# Patient Record
Sex: FEMALE | Race: OTHER | HISPANIC OR LATINO | Employment: UNEMPLOYED | URBAN - METROPOLITAN AREA
[De-identification: names, ages, dates, MRNs, and addresses within clinical notes are randomized per-mention and may not be internally consistent; named-entity substitution may affect disease eponyms.]

---

## 2021-07-08 ENCOUNTER — OFFICE VISIT (OUTPATIENT)
Dept: OBGYN CLINIC | Facility: CLINIC | Age: 26
End: 2021-07-08

## 2021-07-08 VITALS — HEART RATE: 75 BPM | DIASTOLIC BLOOD PRESSURE: 71 MMHG | SYSTOLIC BLOOD PRESSURE: 108 MMHG | WEIGHT: 120 LBS

## 2021-07-08 DIAGNOSIS — N92.6 MISSED MENSES: Primary | ICD-10-CM

## 2021-07-08 LAB — SL AMB POCT URINE HCG: POSITIVE

## 2021-07-08 PROCEDURE — 81025 URINE PREGNANCY TEST: CPT | Performed by: OBSTETRICS & GYNECOLOGY

## 2021-07-08 PROCEDURE — 76830 TRANSVAGINAL US NON-OB: CPT | Performed by: OBSTETRICS & GYNECOLOGY

## 2021-07-08 PROCEDURE — 99214 OFFICE O/P EST MOD 30 MIN: CPT | Performed by: OBSTETRICS & GYNECOLOGY

## 2021-07-08 NOTE — PROGRESS NOTES
FIRST TRIMESTER OBSTETRIC ULTRASOUND  7/8/2021  Michael Hudson MD     INDICATION: Amenorrhea, viability    COMPARISON: None  TECHNIQUE:   Transvaginal imaging was performed to assess the gestation, myometrial/endometrial architecture and ovarian parenchymal detail  The study includes volumetric sweeps and traditional still imaging technique  FINDINGS:     Twin intrauterine gestation is identified  Cardiac activity is detected at 187 bpm for twin A and 185 bpm for twin B  YOLK SAC:  Present and normal in size and appearance  MEAN CROWN RUMP LENGTH:  Twin A: 2 12 cm= 9w0d  Twin B: 2 37 cm= 8w6d  AMNIOTIC FLUID/SAC SHAPES:  Within expected normal range  UTERUS/ADNEXA:   No adnexal mass or pathologic cyst   No free fluid identified  IMPRESSION:    According to , will date based off sono  Final DARIA: 2/11/2022  Gestational age: 10w7d  Fetal cardiac activity detected  No adnexal masses seen

## 2021-07-19 ENCOUNTER — TELEMEDICINE (OUTPATIENT)
Dept: OBGYN CLINIC | Facility: CLINIC | Age: 26
End: 2021-07-19

## 2021-07-19 ENCOUNTER — TELEPHONE (OUTPATIENT)
Dept: PERINATAL CARE | Facility: CLINIC | Age: 26
End: 2021-07-19

## 2021-07-19 DIAGNOSIS — Z34.91 PRENATAL CARE IN FIRST TRIMESTER: Primary | ICD-10-CM

## 2021-07-19 PROCEDURE — 99212 OFFICE O/P EST SF 10 MIN: CPT

## 2021-07-19 NOTE — TELEPHONE ENCOUNTER
Annie Souza the nurse from the Ob's office called to schedule new patient appointment  She had asked us to call the patient and try to schedule her in [facility]  I called the patient and left message for her to call us back to confirm appointment scheduled for her 8/11/21 @ 8 am in [facility]  She was put on the scheduled tentatively until patient confirms  New patient packet mailed to patient's address

## 2021-07-19 NOTE — LETTER
Proof of Pregnancy Letter    Haris Ribeiro  1995  [address]        07/19/21      Haris Ribeiro is a patient at our facility  Haris Ribeiro Estimated Date of Delivery: 2/11/2022       Any questions or concerns, please feel free to contact our office      Sincerely,     [facility]

## 2021-07-19 NOTE — PROGRESS NOTES
Virtual Brief Visit    Verification of patient location:    Patient is currently located in the state [facility]  Patient is currently located in a state in which I am licensed    Assessment/Plan:    Problem List Items Addressed This Visit     None      Visit Diagnoses     Prenatal care in first trimester    -  Primary    Relevant Orders    Prenatal Panel    Hepatitis C antibody    Hemoglobin Electrophoresis    Glucose, 1H PG    Ambulatory referral to social work care management program    Ambulatory Referral to Maternal Fetal Medicine                Reason for visit is   Chief Complaint   Patient presents with    Initial Prenatal Visit        Encounter provider Kedar Stephens RN    Provider located at [address] [place] [id number] [phone]    Recent Visits  No visits were found meeting these conditions  Showing recent visits within past 7 days and meeting all other requirements  Today's Visits  Date Type Provider Dept   07/19/21 Telemedicine Kedar Stephens RN  [address] [facility] [address] [place]hatt today's visits and meeting all other requirements  Future Appointments  No visits were found meeting these conditions  Showing future appointments within next 150 days and meeting all other requirements       After connecting through telephone, the patient was identified by name and date of birth  Ham Glen was informed that this is a telemedicine visit and that the visit is being conducted through telephone  My office door was closed  No one else was in the room  She acknowledged consent and understanding of privacy and security of the platform  The patient has agreed to participate and understands she can discontinue the visit at any time  Patient is aware this is a billable service  OBSTETRIC INTAKE VISIT    Ham Carroll presents today for initial OB intake    History obtained from patient and she reports it as follows:    Past Medical History:   Diagnosis Date    Lupus ([address] )      Past Surgical History:   Procedure Laterality Date    CHOLECYSTECTOMY       OB History    Para Term  AB Living   3 2 2 0 0 2   SAB TAB Ectopic Multiple Live Births   0 0 0 0 2      # Outcome Date GA Lbr Kushal/2nd Weight Sex Delivery Anes PTL Lv   3 Current            2 Term 12/25/15 38w0d  3345 g (7 lb 6 oz) M Vag-Spont  N ALINE   1 Term 14 40w0d  3345 g (7 lb 6 oz) M Vag-Spont  N ALINE     Social History     Tobacco Use    Smoking status: Never Smoker    Smokeless tobacco: Never Used   Vaping Use    Vaping Use: Never used   Substance Use Topics    Alcohol use: Not Currently    Drug use: Never       No current outpatient medications    No Known Allergies    Dating of Pregnancy: ultrasound done 2021, DARIA 2022  Twins    Review of Systems:   Denies vaginal bleeding or leaking of fluid  Denies uterine contractions or abdominal pain  Exam:  LMP 2021 (Exact Date)        Plan:  1  Prenatal care in first trimester  - Prenatal Panel  - Hepatitis C antibody  - Hemoglobin Electrophoresis  - Glucose, 1H PG  - Ambulatory referral to social work care management program  - Ambulatory Referral to Maternal Fetal Medicine  2  Next ultrasound scheduled for [address] [facility] staff to call patient with appointment  3  Return 2021 for pn H/P  4   Reviewed [facility]'s Pregnancy Essential Guide,  the following educational topics with patient:   -routine prenatal visit/ultrasound/labwork schedule   -nutritional demands of pregnancy, healthy dietary habits   -listeria, toxoplasmosis, seafood precautions   -weight gain expectations (based on pre-pregnant BMI)   -exercise, rest, and sexual activity during pregnancy   -abstinence from alcohol, tobacco, and illegal drugs   -common discomforts of pregnancy and appropriate management   -OTC medications safe to use in pregnancy   -genetic screening options   -[facility] referral   -symptoms to report to [facility] provider    -signs of PTL    -vaginal bleeding/leaking of fluid    -severe n/v-unable to tolerate ANY food/fluids for more than 24 hours    Explained to patient how to contact Health Call After office hours  I spent 40 minutes with patient today in which greater than 50% of the time was spent in counseling/coordination of care regarding prenatal patient  Nuris Fernandez RN  7/19/2021    VIRTUAL VISIT DISCLAIMER      Christi Keller verbally agrees to participate in [employer]  Pt is aware that [employer] could be limited without vital signs or the ability to perform a full hands-on physical Burnice Robbie understands she or the provider may request at any time to terminate the video visit and request the patient to seek care or treatment in person

## 2021-07-19 NOTE — PATIENT INSTRUCTIONS
El Primer Trimestre  (hasta 14 semanas)   ARVIZU BEBÉ   · Arvizu bebé comienza a desarrollarse cuando el óvulo y un espermatozoide se unen  · Arvizu bebé crece dentro de arvizu útero (también llamado tu vientre)  Flota dentro de kolton bolsa de agua - llamado el saco amniótico  El bebé está conectado a ti por un cordón umbilical que va desde el vientre del bebé a la placenta  La placenta se une a arvizu útero y la placenta es donde Kiana, oxígeno y alimentos cruzan encima de usted a arvizu bebé  · Cosas camilo drogas, alcohol y tabaco pueden también cruzar de usted a arvizu bebé a través de la placenta  Es importante evitar estas cosas, camilo pueden ser perjudiciales para cómo tu bebé se desarrolla y crece  · Al final del primer mes, late el corazón de arvizu bebé  El bebé es aproximadamente del tamaño de un trozo de arroz  · Al final del tashia mes, todos los órganos del bebé (corazón, pulmones, cerebro, cráneo) toussaint formado completamente  Ahora sólo tienen que seguir madurando y creciendo  El bebé es aproximadamente del tamaño de [place]  · Al final del tercer mes, arvizu bebé es de aproximadamente 4 pulgadas de jessica! TU CUERPO   · Arvizu período se detiene - esto puede ser la primera señal que tienes que está [place]   · Tus pechos pueden volverse dolorido y [place]  · Se puede sentir muy cansada  · Usted puede tener un malestar estomacal con náuseas o vómitos que pueden ocurrir en cualquier momento del día - o a veces won todo el día  · Usted puede sentirse malhumorado y gruñón  También puede sentir miedo o phillips  [place] es normal y natural    · Usted puede perder o ganar peso  [place] está tory, siempre y cuando usted no está perdiendo o ganando [employer]          [place] Trimestre  (14-28 semanas)   ARVIZU BEBÉ   · 4to mes   · arvizu bebé tiene pestañas y sandy   · arvizu bebé patea, se mueve y se traga   · arvizu bebé es 6 a 7 pulgadas de jessica   · 5to mes   · arvizu bebé tiene uñas   · arvizu bebé Glenda Hough a dormir y despertar ciclos   · bebé Kale Bars a ser Starlene Hair Todd Hernandezough (aunque no siempre sientes lo) girando de lado a lado y [place]   · rios bebé es de 8 a 12 pulgadas de jessica y pesa en cualquier lugar de ½ a 1 candace   · 6to mes   · los ojos de rios bebé están gege completamente formados y pronto comenzarán a abrir y cerrar   · la piel del bebé es Esteban Dally y [place] y [place] con pelo bj y [place] llamado "lanugo"   · bebé sigue siendo muy activo y que debe estar sintiendo muy tory y muy a menudo   · rios bebé es de 11 a 14 pulgadas de jessica     TU CUERPO   · Nauseas del embarazo usualmente comienza a desaparecer y las mujeres generalmente comienzan a subir de peso más rápidamente  · Puede comenzar a tener estrías en el vientre o senos que pueden ser "picores"  Frotarlas loción sobre ellos para ayudar a aliviar la Elveria Stake  · Tu flujo vaginal puede llegar a ser de color blanquecino  · Usted puede ser estreñido  Intente aumentar la Luis Schwab, o puede amairani kolton pastilla de suavizante de heces (camilo Colace) para aliviar el malestar  · Puede comenzar a tener ardor de estómago  Medicamentos camilo Tums o Maalox pueden ayudar a aliviar esto  Trate de permanecer en kolton posición sentada por lo menos kolton hora después de las comidas para disminuir la acidez estomacal    · Deberías probar a 8 horas de sueño en la noche, además de kolton siesta won el día si es posible  · No deberías sentarte won más de dos horas sin amairani un descanso para estirar las piernas  El Tercer Trimestre  (28-42 semanas)   RIOS BEBÉ   · rios bebé chupa rios dedo ahora! · rios bebé puede oír voces y responder al tacto    habla con Jessica Naik!!   · el cerebro de rios bebé crece y se desarrolla más en los últimos 2 meses de embarazo   · germán y [place] del bebé son Judieth Chafe y flexibles para que quepan por el canal del parto   · los movimientos del bebé cambian hacia el final del embarazo porque hay menos espacio para patear y estiramientos en tu vientre   · los pulmones del bebé no están completamente desarrollados y totalmente preparados para respirar por amanda propios hasta el último 3-4 semanas antes de rios fecha de vencimiento     TU CUERPO   · rios vientre está creciendo mucho ahora   · será más difícil dormir tory de noche o ser tan activos camilo eres generalmente   · puede sudar más de lo habitual   · serás más desequilibrado    tenga cuidado de no caer! · Usted puede desarrollar hemorroides (qué pueden ser dolorosas y hacen difícil sentarse)   · los dos últimos meses del embarazo puede ser muy incómodos con darrick de [place], darrick de [place] y ardor de estómago   · Puedes empezar a tener contracciones    mientras son irregulares y Michelle de 5 por hora, esto es kolton parte normal de rios cuerpo a punto de tener un bebé   · el merary uterino puede empezar a dilatar y abrir [facility]    para estar listo para el parto   · Usted puede encontrarse que necesitan hacer orinar muy a menudo    porque el bebé está presionando mucho la vejiga   · Usted puede quedarse corta de respiracion más rápido de lo kodi          Mi bebé está desarrollando normalmente? ESTUDIOS GENETICO      Kolton de las preocupaciones que muchas mujeres tienen acerca de rios embarazo es si rios bebé se está desarrollando normalmente  Existen varias pruebas diferentes que podemos usar para tratar de ayudar y determinar si los bebés están desarrollando normalmente o no  Algunas mujeres tienen un riesgo más alto para que rios bebé se desarrollan anormalmente (a veces llamado un defecto de nacimiento), roberto le puede pasar a CUALQUIER  Es por eso que ofrecemos estas pruebas a TODAS las mujeres won amanda [place]  Ninguno de [place] exámenes son requerido  Es rios decisión cuáles puede elegir o NO eligir ninguno won rios embarazo  Algunas de estas pruebas sólo están disponibles en un determinado momento won rios embarazo, así que es importante amairani decisiones sobre las pruebas qué desea temprano en el Chuck Beau   Aquí hay información sobre estas pruebas diferentes para ayudarle a amairani decisiones sobre si alguna de estas pruebas son [place] para usted  * ANATOMIA ULTRASONIDO : esta ultrasonido se realiza entre 18 a 25 semanas y Cherelle de cerca a todas partes de rios bebé y piezas para buscar signos de cualquier desarrollo anormal; el ultrasonido no es perfecto y NO PUEDE detectar TODOS los tipos de defectos de nacimiento (especialmente síndrome de [place]) - roberto es kolton prueba [place]  * PROYECCIÓN SECUENCIAL: esta es realmente kolton combinación de pruebas que incluyen un ultrasonido que mide la parte posterior del merary de rios bebé [employer] semanas 11-13 y análisis de beni de usted en que kolton y otra vez entre las semanas 15-22; Derrick examen puede ayudar a decirnos el riesgo para rios bebé se ve afectada por ciertas anomalías cromosómicas o defectos congénitos  * CUÁDRUPLE PANTALLA: derrick examen se hace con análisis de beni sólo de usted entre 15-22 semanas de [facility]; puede ayudar a decirnos el riesgo para rios bebé se ve afectada por ciertas anomalías cromosómicas o defectos congénitos  * CELULAR-NICOLE DE ADN : esta prueba se realiza con análisis de beni sólo de usted cualquier momento después de 10 semanas de embarazo; es muy preciso al decirnos si rios bebé tiene kolton evelyn probabilidad de síndrome de Down u otras anomalías del cromosoma roberto es generalmente reservado para las mujeres que tienen un factor de alto riesgo para un bebé con kolton anormalidad del cromosoma  * LAI MATERNAL penitentiary-FETO PROTEINA PANTALLA: derrick examen se hace con el solo análisis de [place] de entre 15-22 semanas de Janet, nos ayuda a [employer] idea si rios mary jane esta desarrollando un defecto de nacimiento camilo melinda bifida       * AMNIOCENTESIS : Esta prueba implica el uso de kolton aguja muy deysi que se inserta en el útero para sacar un poco de líquido alrededor de rios bebé para la prueba; se puede realizar cualquier momento después de 16 semanas de embarazo; [facility] nos dice con certeza si rios bebé tiene defectos de nacimiento particular (sobre todo anormalidades del cromosoma); hay un pequeño riesgo de aborto espontáneo que [place] cuando marlon tiene esta prueba realizada; esta prueba es generalmente reservada para las mujeres que tienen un factor de alto riesgo para un bebé con kolton anormalidad  Las mujeres que tienen un riesgo más alto para los bebés que se desarrollan anormalmente (a veces llamado un defecto de nacimiento) incluir a las mujeres con los siguientes:   · 28 años de edad o mayores   · Obesidad en el momento de la patrizia   · Diabetes en el momento de la patrizia   · Un sushma anterior con un defecto de nacimiento   · Tomando medicamentos que pueden causar un defecto de nacimiento     Aquí están algunas preguntas importantes para hacerse al decidir que (eventualmente) de pruebas quiero tener  · ¿Quiero saber antes de nacer si mi bebé tiene un defecto de nacimiento? · ¿Qué haré con esta información si el resultado muestra kolton gran oportunidad para un defecto de nacimiento? · ¿Cómo aprender acerca de un defecto de nacimiento me ayudaría a amairani decisiones sobre mi embarazo? · ¿Estas pruebas me ayudará sentir más tranquilos o más ansiedad y miedo?              [address] está kolton lista de [place] que son seguros para amairani won el embarazo sin tener que discutir con el médico o enfermera:     ·        Tylenol/Acetaminophen (dolor de germán Tamanna Hamper)   ·        Benadryl/Diphenhydramine (alergias, congestión nasal, insomnio, picazon)   ·        Claritin/Loratidine (alergias, congestión nasal)   ·        Zyrtec/Cetirizine (alergias, congestión nasal)   ·        Robitussin/Guaifenesin regular (tos)   ·        Sudafed/Pseudoephedrine (congestión nasal)   ·        Colace/Docusate sodium (estreñimiento)   ·        Maalox/Magnesium hydroxide (acidez, indigestión)   ·        Zantac/Ranitidine (acidez, indigestión)   · Pepcid/Famotidine (acidez, indigestión)   ·        Tums/Calcium carbonate Liane Lout, náuseas)   ·        Kaopectate/Bismuth subsalicylate (diarrea)   ·        Immodium/Loperamide (diarrea)   ·        Vitamina B6/Pyrodoxine (náuseas)   ·        Doxylamine/Unisom (náuseas, insomnio)     Debe discutir con nuestra enfermera practicante o apple de nuestros médicos antes de amairani cualquier otro medicamento  NUTRICION EN EL EMBARAZO  [place] nutrición es kolton parte importante de tener un embarazo saludable y un bebé thuy  Usted debe seguir kolton dieta saludable que incluyen los siguientes:   · Verduras (que son oscuros katherin y frondoso): al menos 2 raciones cada día   · Proteína (carne, huevos, frijoles, nueces, mantequilla de [place]): 3-4 raciones cada día   · Granos panes/todo (pan, pasta, arroz, tortillas, prabhakar): 3 porciones cada día   · Productos lácteos ([place], yogur, Dawson-barre): 3-4 raciones cada día   · De agua: 6-8 vasos por día   · Calorías: aproximadamente 2000 a 2200 calorías por día    AUMENTO DE PESO   Aumento de peso recomendado para usted won rios embarazo se basa en el índice de masa corporal ([address]) en el momento en que usted Yaz Jose  Aumento de peso recomendado de [address] antes del embarazo   Bajo peso [facility] inferior a 18,5) 28 a 40 libras   Normal (IMC 18 5-24 9) de peso 25 a 35 libras   Sobrepeso (IMC 25-29 9) 15 a 25 libras   Tanya ([address] de 30 o mayor) 11 a [address] LOS ALIMENTOS   Es muy importante comer sólo alimentos preparados en forma vines won el embarazo camilo usted y rios bebé tienen un riesgo más alto de lo habitual para ser afectados por enfermedades transmitidas por los alimentos   Siga estos pasos para asegurarse de que usted y rios bebé estén a georgie de las enfermedades transmitidas por los alimentos won el embarazo:   · imelda tory las césar con agua tibia y jabón antes y después de manipular cualquier alimento   · Loralyn Mleonie de cortar, platos, utensilios y mostradores con [place] y jabón antes y después de preparar cualquier alimento   · enjuague de frutas crudas y verduras minuciosamente bajo chorro de agua antes de comer   · [employer] y la carne cruda separada de otros alimentos y usar tableros/utensilios de cha diferentes para manejar carne cruda de otros alimentos   · poner alimentos cocidos en un plato recién limpio   · Cocine todos los alimentos tory   · Deseche los alimentos que se santamaria dejado hacia fuera para más de 2 horas   · Refrigere o congele alimentos que pueden echar a perder     Hay courtney alimentarias particulares riesgos que tener en cuenta y evitar ya que pueden causar grave dañan a rios sushma zaria  * Listeria (kolton bacteria perjudicial)   · no comer salchichas o embutidos (a menos que esté recalentados hasta cocer al vapor caliente)   · no coma quesos blandos (tales camilo Feta, Brie, Camembert) a menos que específicamente se etiquetan camilo siendo "hecho con Hannah Plump"   · no hernan leche cruda (sin pasteurizar)   · no comer patés refrigerados o se separa de la carne   · no coma mariscos ahumados refrigerados a menos que sea en un plato cocinado camilo kolton cacerola    * Jluis (un metal que se encuentra en ciertos pescados en niveles altos)   · no coma tiburón, lofolátilo, caballa o pez tika   · no coma más de 12 onzas por semana de camarón, salmón, abadejo o bagre   · al comer atún, puede tener hasta 6 onzas por semana de atún enlatado o WATZING a 12 onzas de atún enlatado    * Toxoplasma (parásito dañino)   · carne de cook o antes de comer   · usar guantes jardinería o manipulación de arena del arenero infantil   · si tienes un antonieta, pídale a alguien que cambie la caja de arena won el [facility]  Si tienes que limpiar usted mismo, asegúrese de [employer] césar después con agua tibia y Carlton Bitters     · no conseguir un antonieta nuevo won el [facility]            E[facility]ERCICIO Y [facility]    El ejercicio tiene muchos benficios para Fransisco Singleton:   · Mejora tu postura y apariencia   · Jumana el dolor de espalda   · Mejora la circulacion   · Aumenta la flexibilidad   · Disminuye el estres   · Clois Matthew ayuda a sentirse tory   · Te da energia   · Ayuda a que pueda dormir   · Uma Billet y estira tus músculos, que pueden ayudar a aliviar los malestares del embarazo   · Aumenta rios resistencia y flexibilidad     Más thuy que va en mano de obra, el más rápido y más fácil que será rios recuperación después del nacimiento  El ejercicio puede ser moraes para rios bebé [place]  Tanto de se siente tranquilo y phillips después de un entrenamiento  Además, rios bebé le gusta el movimiento  Cuanto tiempo de ejercicio   Consulte con rios proveedor de shana antes de comenzar y asegurar de que usted esta lo suficientemente yolette camilo para empezar hacer ejercicio  Si haces ejercicios antes del embarazo, es aceptable para hacer ejercicio moderado de 30 minutos o más cada día  Si no, empezar con 20 minutos al día, 3 veces por semana  Si se activan antes de rios embaraza, es seguro continuar  Si no estas acustombrada a correr, el embarazo no es un buen momento para empezar  El Primer Trimestre   En los primeros courtney meses puede sentirse cansada y enferma de rios estómago  Ejercicio sólo cuando se sienta mejor  Sin embargo, incluso un poco de [place] puede aumentar rios energía  Considere kolton caminata, estiramiento o poco de yoga  El OTROUZA Trimestre   Podría tener más [place], así que aprovechar los tiempos cuando se siente tory para hacer actividades para disfrutar  Seguros ejercicios son aerobicos de bajo impacto (que elevan rios ritmo cardiaco), fácil de bailar, carminar, nadar, ciclismo estacionario, esquí fácil, y yoga  Ir fácil en deportes de "alto impacto" camilo correr, tenis, o deportes que pueden causar caída, camilo el esquí alpino, o paseos a radha  El Tercer Trimestre   Sentirse mas cansada y Agia Thekla darrick de espalda por el peso extra que Kayli Minna    Tu tercer trimestre es un momento importante para utilizar kolton buena postura, doble las rodillas para recoger cosas, y no levante objetos pesados  Normas de Seguridad   · [employer] siempre antes y despues del ejercicio  · Nunca entrenamiento tan betzy que no puedes hablar al [employer]  · No ejercer en tiempo muy caliente o muy frio  · Beber mucha agua antes, won, y despues del ejercicio  · Ser conscientes de rios nivel de comodidad, dejar lo que estas hacienda si tienes dolor, si se sientes debil, o si estas agotada  · Evitar acostarse sobre rios espalda despues de rios primer trimester, por que esta posicion puede disminuir el flujo de beni a rios utero  NÁUSEAS Y VÓMITOS EN EL EMBARAZO    1  comer comidas y meriendas poco a poco   2  comer cada 1-2 horas para evitar el estómago lleno  3  no saltarse las comidas, evitar el estómago vacío  4  comer un bocado antes de levantarse de la cama  5  Evite alimentos y bebidas con un [employer]  6  evitar la deshidratación: beber suficiente líquido para mantener rios orina de color amarillo pálido  7  beber líquidos antes de kolton comida para minimizar el efecto de un estómago lleno  8  limitar la cantidad de café y bebidas que contienen cafeína  9  eliminar picante, olor, alto de grasa (alimentos fritos), ácido (productos de [place]) y alimentos dulces  10  líquido que contiene renata, menta o naranja puede ser generalmente tory tolerado  11  snacks y comidas que contienen proteína baja en grasa (diandra magras, pescado, aves de thomas y [place]) junto con comer carbohidratos fácilmente digeribles (frutas, arroz, pan kiley, galletas y cereal seco) pueden ser mejor toleradas  12  los alimentos con el jengibre pueden ser [employer]  Trate de usar polvo de raíz de jengibre, [place], o extraer (hasta 1000mg por día)  13  beber líquidos en pequeñas cantidades    14  trate de amairani vitamina B6 (50mg al acostarse, 25mg en la mañana y 25mg en la tarde) con Unisom/doxilamina (25mg al Iris Ontiverosigan)  [address] y los vómitos persisten, póngase en contacto con la ofgabrielaa  Gale    1  Es [place]? Las relaciones sexuales regularmente son perfectamente Alva Cheers y no le hacen leland a rios mary jane que esta creciendo  Diane Childes y los orgasmos estan tory a menos que usted tenga algun problema medico   Si usted esta preocupada, discutalo con rios proveedor de shana  2  Alba Clink bravo seguido esta tory tener relaciones sexuales? Tener relaciones sexuales frecuentemente no le hace leland a rios mary jane si usted esta teniendo un embarazo saludable  3  Tener relaciones sexuales puede provocar un aborto involuntario? No hay ninguna informacion que relacione tener un orgasmo con tener un aborto involuntario  Sin embargo, si usted tiene historia de surinder tenido un aborto involuntario, rios proveedor de shana le puede recomendar que tenga cuidado y que no tenga relaciones sexuales y orgasmos won el primer trimestre del [place]ver Mainor  4  Que puede pasar si siento que el mary jane se mueve despues de tener relaciones sexuales? No  El mary jane esta muy tory protegido en el utero  Y, usualmente el mary jane se mueve mucho sin importar lo que usted jason  5  Esta tory que mi sukumar ponga peso sobre mi estomago? No, especialmente cuando rios estomago empiece a crecer mas  Encuentren otras posiciones para tender relaciones sexuales won el embarazo  Traten de tenerrelaciones sexuales acostados de lado o usted puede ponerse encima de rios sukumar  No se acueste boca arriba  6  Tener relaciones sexuales puede causare un parto prematuro? Normalmente no  Sin embarago, los orgasmos causan que el utero tenga contracciones ligeras si usted esta cerca al [place] de peter a asif    Si usted tiene historia de haver tenido un parto prematuro, rios proveedor de [employer] a recomendar que no tenga relaciones sexuales y [place]  Tambien la estimulacion de los senos causa que el utero se contraiga si usted esta cerca del Mika de peter a asif  Preguentele a rios proveedor de shana si esto es seguro para usted  [address] son seguras cleopatra el Jennifer Griffin? No   Nunca deje que rios sukumar le sople en rios vagina  No ponga ninjun objeto en rios vagina que le pueda hacer leland o causar kolton infeccion  Si usted tiene sangrado excesivo o si rios apolonia se rompe mientras esta teniendo [employer], detengase y llame a rios proveedor de shana inmediatamente  8  Que pasa si yo no tengo ganas de tener relaciones sexuales? Hable francamente con rios sukumar  Al comienzo del Jennifer Griffin, usted puede estar muy cansada o puede sentirse mal del estomago  En las ultimas semanas del [employer] cambio fisicos que usted esta teniendo pueden hacerla sentir muy grand o muy incomoda para tener relaciones sexuales  Para mo tiempo puede que usted derrick pensando mas en la maternidad que en tener relaciones sexuales  9  Hay algunas ocasiones en las que debiera evitar tener relaciones sexuales? Si, si:  · tener relaciones sexuales es doloroso  · usted tiene sangrado vaginal  · usted tiene parto prematuro  · Rios apolonia se rompio  · usted esta embarazada con gemelos o mas  · usted o rios sukumar tienen cualquier otra enfermedad venera o kolton enfermedad transmitida por relaciones sexuales  · usted no puede encontrar kolton posicion comoda          [place] CLEOPATRA EL EMBARAZO  Llame a Youlanda Aiden al [phone] para cualquiera de los siguientes:    1  sangrado vaginal  2  Dolor abdominal mickey que no desaparece  3  Fiebre (más de 100 4 y no se amaris con Tylenol)  4  Vómitos persistentes que miller más de 24 horas  5  Dolor de pecho  6  Dolor o ardor al orinar  7  Dolor de germán severo que no se resuelve con Tylenol  8  Visión borrosa o liza puntos en rios visión    9  Hinchazón repentina de rios issac o césar  10  Enrojecimiento, hinchazón o dolor en kolton pierna  11  Un aumento de peso repentino en pocos días  12  Contar los movimientos fetales del bebé  (después de 28 semanas o el sexto mes de embarazo)  15  Kolton pérdida de líquido acuoso de la vagina: puede ser un chorro, un goteo o kolton humedad continua  14  Después de 20 semanas de embarazo, calambres rítmicos (más de 4 por hora) o menstruales camilo dolor bajo / Kirt Lull MATERNA     BENEFICIOS PARA LOS BEBÉS   · sistemas inmunológicos más lucila (menos alergias, eczema, asma y cáncer infantil)   · menos diarrea y estreñimiento u otras enfermedades GI   · menos resfriados e infecciones del oído   · mejor visión y dientes (menos cavidades)   · mejora el IQ   · menores tasas de diabetes y obesidad en la infancia     BENEFICIOS PARA LAS MADRES   · promueve la pérdida de peso más rápida después del parto   · rachid riesgo para la depresión postparto   · rachid riesgo para los cánceres [place], útero y ovario   · rachid riesgo para la osteoporosis en desarrollo con la edad   · siempre es más fácil que fórmula - correcto, limpio, y la temperatura adecuada   · menos costosa que la fórmula es gratis!!!     CLAVES PARA KOLTON LACTANCIA EXITOSA   · mantener bebé piel a piel hasta después de la primera alimentación evento   · tener a bebé en rios cuarto con usted won rios estadía en el hospital después del parto   · evitar cualquier botella de alimentación (a menos que sea médicamente necesario)   · limitar el uso de chupones y pañales   · Pida ayuda si usted está teniendo problemas    consultores de lactancia (que se especializan en la lactancia) están disponibles para ayudarle a   · kolton dieta saludable para mamá    comiendo kolton variedad de alimentos y raciones con moderación     COSAS QUE DEBES SABER SOBRE LA LACTANCIA   · mayoría de los medicamentos se considera compatible con la lactancia materna por [address] [facility] [place] consultarlo con rios médico o en lactancia antes de amairani un medicamento nuevo    para estar seguro es seguro   · alcohol (cerveza, vino, licor) puede transmitirse de la madre al bebé a través de la Dani    un ocasional, social bebida es considerado aceptable por [address]    más que eso deben evitarse   · la lactancia materna es NO es un método para evitar embarazo   · nicotina (cigarrillos) puede pasar de la madre al bebé a través de la Dani    sin embargo, para las [employer], es aún más saludable para amamantar que use la fórmula   · cafeína debería limitarse a 1-3 tazas por día    incluye café, refrescos, bebidas energéticas           VACUNAS CLEOPATRA EL EMBARAZO    TDAP  La tos ferina (o tos [place]) puede ser grave para cualquier persona, roberto para rios recién nacido, puede ser mortal  Hasta 20 recién nacidos mueren cada año en los [place] debido a la tos [place]  Aproximadamente la mitad de los bebés menores de 1 año de edad que contraen tos ferina necesitan tratamiento en el hospital  Cuanto más joven es el bebé cuando él o leopoldo son la tos Melany Ally probable es que él o leopoldo tendrá que ser tratado en un hospital  Cuando usted recibe la vacuna contra la tos ferina (Tdap) cleopatra rios embarazo, rios cuerpo creará anticuerpos protectores y algunos de ellos pasan a rios bebé antes de nacer  Estos anticuerpos pueden ayudar a proteger a rios bebé contraigan la tos ferina hasta que tienen edad suficiente para recibir la vacuna ellos mismos (generalmente alrededor de 6 meses de edad)  INFLUENZA  Cambios en las funciones inmunológica, del corazón y pulmón cleopatra el embarazo te hacen más susceptible a padecer seriamente enfermo de la gripe  Coger la gripe también aumenta las posibilidades de problemas graves para rios bebé en desarrollo, incluyendo la entrega y el trabajo de [place]   Se recomienda que todas las mujeres que están embarazadas cleopatra la temporada de gripe deben recibir kolton vacuna contra la influenza  USO DE CINTURÓN DE SEGURIDAD EN EL EMBARAZO    Si usted está embarazada o no, deben usar el cinturón de seguridad cada vez que estás en un coche  El cinturón de seguridad es la mejor protección para ambos usted y rios sushma zaria  Para utilizar correctamente el cinturón de seguridad won el embarazo, puede que deba ajustar el asiento delantero varias veces camilo crece de modo que siempre hay por lo menos 10 pulgadas entre el centro de rios pecho y el clayton de manejar o del panel de control, y llegar cómodamente a los pedales  La smith del hombro deben colocar sobre el pecho (entre amanda senos) y lejos de rios merary  El cinturón de seguridad debe fijarse por debajo de rios vientre para que se ajuste cómodamente a través de las caderas y la pelvis ósea  NO debe desactivar la bolsas de aire de rios vehículo  Bolsas de aire y cinturones de seguridad funcionan mejor cuando se utilizan juntos para proteger a rios sushma del unborn

## 2021-07-19 NOTE — LETTER
El Primer Trimestre  (hasta 14 semanas)   ARVIZU BEBÉ   · Arvizu bebé comienza a desarrollarse cuando el óvulo y un espermatozoide se unen  · Arvizu bebé crece dentro de arvizu útero (también llamado tu vientre)  Flota dentro de kolton bolsa de agua - llamado el saco amniótico  El bebé está conectado a ti por un cordón umbilical que va desde el vientre del bebé a la placenta  La placenta se une a arvizu útero y la placenta es donde [place], oxígeno y alimentos cruzan encima de usted a arvizu bebé  · Cosas camilo drogas, alcohol y tabaco pueden también cruzar de usted a arvizu bebé a través de la placenta  Es importante evitar estas cosas, camilo pueden ser perjudiciales para cómo tu bebé se desarrolla y crece  · Al final del primer mes, late el corazón de arvizu bebé  El bebé es aproximadamente del tamaño de un trozo de arroz  · Al final del tashia mes, todos los órganos del bebé (corazón, pulmones, cerebro, cráneo) toussaint formado completamente  Ahora sólo tienen que seguir madurando y creciendo  El bebé es aproximadamente del tamaño de [place]  · Al final del tercer mes, arvizu bebé es de aproximadamente 4 pulgadas de jessica! TU CUERPO   · Arvizu período se detiene - esto puede ser la primera señal que tienes que está [place]   · Tus pechos pueden volverse dolorido y [place]  · Se puede sentir muy cansada  · Usted puede tener un malestar estomacal con náuseas o vómitos que pueden ocurrir en cualquier momento del día - o a veces won todo el día  · Usted puede sentirse malhumorado y gruñón  También puede sentir miedo o phillips  [place] es normal y natural    · Usted puede perder o ganar peso  [place] está tory, siempre y cuando usted no está perdiendo o ganando [employer]          [place] Trimestre  (14-28 semanas)   ARVIZU BEBÉ   · 4to mes   · arvizu bebé tiene pestañas y sandy   · arvizu bebé patea, se mueve y se traga   · arvizu bebé es 6 a 7 pulgadas de jessica   · 5to mes   · arvizu bebé tiene uñas   · arvizu bebé Janann [place] a dormir y despertar ciclos   · bebé Delia Goodell a ser Fritzi Buerger Johnna Meir (aunque no siempre sientes lo) girando de lado a lado y [place]   · rios bebé es de 8 a 12 pulgadas de jessica y pesa en cualquier lugar de ½ a 1 candace   · 6to mes   · los ojos de rios bebé están gege completamente formados y pronto comenzarán a abrir y cerrar   · la piel del bebé es [place] y [place] y [place] con pelo bj y [place] llamado "lanugo"   · bebé sigue siendo muy activo y que debe estar sintiendo muy tory y muy a menudo   · rios bebé es de 11 a 14 pulgadas de jessica     TU CUERPO   · Nauseas del embarazo usualmente comienza a desaparecer y las mujeres generalmente comienzan a subir de peso más rápidamente  · Puede comenzar a tener estrías en el vientre o senos que pueden ser "picores"  Frotarlas loción sobre ellos para ayudar a aliviar la Cassandra Marino  · Tu flujo vaginal puede llegar a ser de color blanquecino  · Usted puede ser estreñido  Intente aumentar la Luis Schwab, o puede amairani kolton pastilla de suavizante de heces (camilo Colace) para aliviar el malestar  · Puede comenzar a tener ardor de estómago  Medicamentos camilo Tums o Maalox pueden ayudar a aliviar esto  Trate de permanecer en kolton posición sentada por lo menos kolton hora después de las comidas para disminuir la acidez estomacal    · Deberías probar a 8 horas de sueño en la noche, además de kolton siesta won el día si es posible  · No deberías sentarte won más de dos horas sin amairani un descanso para estirar las piernas  El Tercer Trimestre  (28-42 semanas)   RIOS BEBÉ   · rios bebé chupa rios dedo ahora! · rios bebé puede oír voces y responder al tacto    habla con Carnella Jong!!   · el cerebro de rios bebé crece y se desarrolla más en los últimos 2 meses de embarazo   · germán y [place] del bebé son [place] y flexibles para que quepan por el canal del parto   · los movimientos del bebé cambian hacia el final del embarazo porque hay menos espacio para patear y estiramientos en tu vientre   · los pulmones del bebé no están completamente desarrollados y totalmente preparados para respirar por amanda propios hasta el último 3-4 semanas antes de rios fecha de vencimiento     TU CUERPO   · rios vientre está creciendo mucho ahora   · será más difícil dormir tory de noche o ser tan activos camilo eres generalmente   · puede sudar más de lo habitual   · serás más desequilibrado    tenga cuidado de no caer! · Usted puede desarrollar hemorroides (qué pueden ser dolorosas y hacen difícil sentarse)   · los dos últimos meses del embarazo puede ser muy incómodos con darrick de [place], darrick de [place] y ardor de estómago   · Puedes empezar a tener contracciones    mientras son irregulares y Michelle de 5 por hora, esto es kolton parte normal de rios cuerpo a punto de tener un bebé   · el merary uterino puede empezar a dilatar y abrir [facility]    para estar listo para el parto   · Usted puede encontrarse que necesitan hacer orinar muy a menudo    porque el bebé está presionando mucho la vejiga   · Usted puede quedarse corta de respiracion más rápido de lo kodi          Mi bebé está desarrollando normalmente? ESTUDIOS GENETICO      Kolton de las preocupaciones que muchas mujeres tienen acerca de rios embarazo es si rios bebé se está desarrollando normalmente  Existen varias pruebas diferentes que podemos usar para tratar de ayudar y determinar si los bebés están desarrollando normalmente o no  Algunas mujeres tienen un riesgo más alto para que rios bebé se desarrollan anormalmente (a veces llamado un defecto de nacimiento), roberto le puede pasar a CUALQUIER  Es por eso que ofrecemos estas pruebas a TODAS las mujeres won amanda [place]  Ninguno de [place] exámenes son requerido  Es rios decisión cuáles puede elegir o NO eligir ninguno won rios embarazo  Algunas de estas pruebas sólo están disponibles en un determinado momento won rios embarazo, así que es importante amairani decisiones sobre las pruebas qué desea temprano en el [facility]   Aquí hay información sobre estas pruebas diferentes para ayudarle a amairani decisiones sobre si alguna de estas pruebas son [place] para usted  * ANATOMIA ULTRASONIDO : esta ultrasonido se realiza entre 18 a 25 semanas y [place] de cerca a todas partes de rios bebé y piezas para buscar signos de cualquier desarrollo anormal; el ultrasonido no es perfecto y NO PUEDE detectar TODOS los tipos de defectos de nacimiento (especialmente síndrome de [place]) - roberto es kolton prueba [place]  * PROYECCIÓN SECUENCIAL: esta es realmente kolton combinación de pruebas que incluyen un ultrasonido que mide la parte posterior del merary de rios bebé [employer] semanas 11-13 y análisis de beni de usted en que kolton y otra vez entre las semanas 15-22; Derrick examen puede ayudar a decirnos el riesgo para rios bebé se ve afectada por ciertas anomalías cromosómicas o defectos congénitos  * CUÁDRUPLE PANTALLA: derrick examen se hace con análisis de beni sólo de usted entre 15-22 semanas de [facility]; puede ayudar a decirnos el riesgo para rios bebé se ve afectada por ciertas anomalías cromosómicas o defectos congénitos  * CELULAR-NICOLE DE ADN : esta prueba se realiza con análisis de beni sólo de usted cualquier momento después de 10 semanas de embarazo; es muy preciso al decirnos si rios bebé tiene kolton evelyn probabilidad de síndrome de [place] u otras anomalías del cromosoma roberto es generalmente reservado para las mujeres que tienen un factor de alto riesgo para un bebé con kolton anormalidad del cromosoma  * LAI MATERNAL assisted-FETO PROTEINA PANTALLA: derrick examen se hace con el solo análisis de [place] de entre 15-22 semanas de [facility], nos ayuda a [place] idea si rios mary jane esta desarrollando un defecto de nacimiento camilo melinda bifida       * AMNIOCENTESIS : Esta prueba implica el uso de kolton aguja muy deysi que se inserta en el útero para sacar un poco de líquido alrededor de rios bebé para la prueba; se puede realizar cualquier momento después de 16 semanas de embarazo; [facility] nos dice con certeza si rios bebé tiene defectos de nacimiento particular (sobre todo anormalidades del cromosoma); hay un pequeño riesgo de aborto espontáneo que [place] cuando marlon tiene esta prueba realizada; esta prueba es generalmente reservada para las mujeres que tienen un factor de alto riesgo para un bebé con kolton anormalidad  Las mujeres que tienen un riesgo más alto para los bebés que se desarrollan anormalmente (a veces llamado un defecto de nacimiento) incluir a las mujeres con los siguientes:   · 28 años de edad o mayores   · Obesidad en el momento de la patrizia   · Diabetes en el momento de la patrizia   · Un sushma anterior con un defecto de nacimiento   · Tomando medicamentos que pueden causar un defecto de nacimiento     Aquí están algunas preguntas importantes para hacerse al decidir que (eventualmente) de pruebas quiero tener  · ¿Quiero saber antes de nacer si mi bebé tiene un defecto de nacimiento? · ¿Qué haré con esta información si el resultado muestra kolton gran oportunidad para un defecto de nacimiento? · ¿Cómo aprender acerca de un defecto de nacimiento me ayudaría a amairani decisiones sobre mi embarazo? · ¿Estas pruebas me ayudará sentir más tranquilos o más ansiedad y miedo?              [address] [place] está kolton lista de [employer] que son seguros para amairani won el embarazo sin tener que discutir con el médico o enfermera:     ·        Tylenol/Acetaminophen (dolor de germán Eriberto Reza)   ·        Benadryl/Diphenhydramine (alergias, congestión nasal, insomnio, picazon)   ·        Claritin/Loratidine (alergias, congestión nasal)   ·        Zyrtec/Cetirizine (alergias, congestión nasal)   ·        Robitussin/Guaifenesin regular (tos)   ·        Sudafed/Pseudoephedrine (congestión nasal)   ·        Colace/Docusate sodium (estreñimiento)   ·        Maalox/Magnesium hydroxide (acidez, indigestión)   ·        Zantac/Ranitidine (acidez, indigestión)   · Pepcid/Famotidine (acidez, indigestión)   ·        Tums/Calcium carbonate Natalia Colla, náuseas)   ·        Kaopectate/Bismuth subsalicylate (diarrea)   ·        Immodium/Loperamide (diarrea)   ·        Vitamina B6/Pyrodoxine (náuseas)   ·        Doxylamine/Unisom (náuseas, insomnio)     Debe discutir con nuestra enfermera practicante o apple de nuestros médicos antes de amairani cualquier otro medicamento  NUTRICION EN EL EMBARAZO  Liberal nutrición es kolton parte importante de tener un embarazo saludable y un bebé thuy  Usted debe seguir kolton dieta saludable que incluyen los siguientes:   · Verduras (que son oscuros katherin y frondoso): al menos 2 raciones cada día   · Proteína (carne, huevos, frijoles, nueces, mantequilla de [place]): 3-4 raciones cada día   · Granos panes/todo (pan, pasta, arroz, tortillas, prabhakar): 3 porciones cada día   · Productos lácteos ([place], yogur, Geovany-barre): 3-4 raciones cada día   · De agua: 6-8 vasos por día   · Calorías: aproximadamente 2000 a 2200 calorías por día    AUMENTO DE PESO   Aumento de peso recomendado para usted won rios embarazo se basa en el índice de masa corporal ([facility]) en el momento en que usted Tia Layman  Aumento de peso recomendado de [facility] antes del embarazo   Bajo peso [facility] inferior a 18,5) 28 a 40 libras   Normal (IM 18 5-24 9) de peso 25 a 35 libras   Sobrepeso (IMC 25-29 9) 15 a 25 libras   Tanya ([facility] de 30 o mayor) 11 a [address] LOS ALIMENTOS   Es muy importante comer sólo alimentos preparados en forma vines won el embarazo camilo usted y rios bebé tienen un riesgo más alto de lo habitual para ser afectados por enfermedades transmitidas por los alimentos   Siga estos pasos para asegurarse de que usted y rios bebé estén a georgie de las enfermedades transmitidas por los alimentos won el embarazo:   · imelda tory las césar con agua tibia y jabón antes y después de manipular cualquier alimento   · Lewayne Ours de cortar, platos, utensilios y mostradores con [place] y jabón antes y después de preparar cualquier alimento   · enjuague de frutas crudas y verduras minuciosamente bajo chorro de agua antes de comer   · [employer] y la carne cruda separada de otros alimentos y usar tableros/utensilios de cha diferentes para manejar carne cruda de otros alimentos   · poner alimentos cocidos en un plato recién limpio   · Cocine todos los alimentos tory   · Deseche los alimentos que se santamaria dejado hacia fuera para más de 2 horas   · Refrigere o congele alimentos que pueden echar a perder     Hay courtney alimentarias particulares riesgos que tener en cuenta y evitar ya que pueden causar grave dañan a rios sushma zaria  * Listeria (kolton bacteria perjudicial)   · no comer salchichas o embutidos (a menos que esté recalentados hasta cocer al vapor caliente)   · no coma quesos blandos (tales camilo Feta, Brie, Camembert) a menos que específicamente se etiquetan camilo siendo "hecho con Danis Sjogren"   · no hernan leche cruda (sin pasteurizar)   · no comer patés refrigerados o se separa de la carne   · no coma mariscos ahumados refrigerados a menos que sea en un plato cocinado camilo kolton cacerola    * Jluis (un metal que se encuentra en ciertos pescados en niveles altos)   · no coma tiburón, lofolátilo, caballa o pez tika   · no coma más de 12 onzas por semana de camarón, salmón, abadejo o bagre   · al comer atún, puede tener hasta 6 onzas por semana de atún enlatado o [place] a 12 onzas de atún enlatado    * Toxoplasma (parásito dañino)   · carne de cook o antes de comer   · usar guantes jardinería o manipulación de arena del arenero infantil   · si tienes un antonieta, pídale a alguien que cambie la caja de arena won el [facility]  Si tienes que limpiar usted mismo, asegúrese de [employer] césar después con agua tibia y Jm     · no conseguir un antonieta nuevo won el [facility]            EJERCICIO Y [facility]    El ejercicio tiene muchos benficios para ustedGerAdventist Health Bakersfield - [facility] Right:   · Mejora tu postura y apariencia   · Jumana el dolor de espalda   · Mejora la circulacion   · Aumenta la flexibilidad   · Disminuye el estres   · [employer] ayuda a sentirse tory   · Te da energia   · Ayuda a que pueda dormir   · [place] y estira tus músculos, que pueden ayudar a aliviar los malestares del embarazo   · Aumenta rios resistencia y flexibilidad     Más thuy que va en mano de obra, el más rápido y más fácil que será rios recuperación después del nacimiento  El ejercicio puede ser moraes para rios bebé [place]  Tanto de se siente tranquilo y phillips después de un entrenamiento  Además, rios bebé le gusta el movimiento  Cuanto tiempo de ejercicio   Consulte con rios proveedor de shana antes de comenzar y asegurar de que usted esta lo suficientemente yolette camilo para empezar hacer ejercicio  Si haces ejercicios antes del embarazo, es aceptable para hacer ejercicio moderado de 30 minutos o más cada día  Si no, empezar con 20 minutos al día, 3 veces por semana  Si se activan antes de rios embaraza, es seguro continuar  Si no estas acustombrada a correr, el embarazo no es un buen momento para empezar  El Primer Trimestre   En los primeros courtney meses puede sentirse cansada y enferma de rios estómago  Ejercicio sólo cuando se sienta mejor  Sin embargo, incluso un poco de [place] puede aumentar rios energía  Considere kolton caminata, estiramiento o poco de yoga  El OTROUZA Trimestre   Podría tener más [place], así que aprovechar los tiempos cuando se siente tory para hacer actividades para disfrutar  Seguros ejercicios son aerobicos de bajo impacto (que elevan rios ritmo cardiaco), fácil de bailar, carminar, nadar, ciclismo estacionario, esquí fácil, y yoga  Ir fácil en deportes de "alto impacto" camilo correr, tenis, o deportes que pueden causar caída, camilo el esquí alpino, o paseos a radha  El Tercer Trimestre   Sentirse mas cansada y Agia Thekla darrick de espalda por el peso extra que Imani Scales    Tu tercer trimestre es un momento importante para utilizar kolton buena postura, doble las rodillas para recoger cosas, y no levante objetos pesados  Normas de Seguridad   · [employer] siempre antes y despues del ejercicio  · Nunca entrenamiento tan betzy que no puedes hablar al [employer]  · No ejercer en tiempo muy caliente o muy frio  · Beber mucha agua antes, won, y despues del ejercicio  · Ser conscientes de rios nivel de comodidad, dejar lo que estas hacienda si tienes dolor, si se sientes debil, o si estas agotada  · Evitar acostarse sobre rios espalda despues de rios primer trimester, por que esta posicion puede disminuir el flujo de beni a rios utero  NÁUSEAS Y VÓMITOS EN EL EMBARAZO    1  comer comidas y meriendas poco a poco   2  comer cada 1-2 horas para evitar el estómago lleno  3  no saltarse las comidas, evitar el estómago vacío  4  comer un bocado antes de levantarse de la cama  5  Evite alimentos y bebidas con un [employer]  6  evitar la deshidratación: beber suficiente líquido para mantener rios orina de color amarillo pálido  7  beber líquidos antes de kolton comida para minimizar el efecto de un estómago lleno  8  limitar la cantidad de café y bebidas que contienen cafeína  9  eliminar picante, olor, alto de grasa (alimentos fritos), ácido (productos de Vincent) y alimentos dulces  10  líquido que contiene renata, menta o naranja puede ser generalmente tory tolerado  11  snacks y comidas que contienen proteína baja en grasa (diandra magras, pescado, aves de thomas y [place]) junto con comer carbohidratos fácilmente digeribles (frutas, arroz, pan kiley, galletas y cereal seco) pueden ser mejor toleradas  12  los alimentos con el jengibre pueden ser [employer]  Trate de usar polvo de raíz de jengibre, [place], o extraer (hasta 1000mg por día)  13  beber líquidos en pequeñas cantidades    14  trate de amairani vitamina B6 (50mg al acostarse, 25mg en la mañana y 25mg en la tarde) con Unisom/doxilamina (25mg al Iveth Arnold)  [address] y los vómitos persisten, póngase en contacto con la ofgabrielaa  Gale    1  Es Kristie Neligh? Las relaciones sexuales regularmente son perfectamente Melissa Rajeev y no le hacen leland a rios mary jane que esta creciendo  Doristine Novel y los orgasmos estan tory a menos que usted tenga algun problema medico   Si usted esta preocupada, discutalo con rios proveedor de shana  2  Melody Cure tan seguido esta tory tener relaciones sexuales? Tener relaciones sexuales frecuentemente no le hace leland a rios mary jane si usted esta teniendo un embarazo saludable  3  Tener relaciones sexuales puede provocar un aborto involuntario? No hay ninguna informacion que relacione tener un orgasmo con tener un aborto involuntario  Sin embargo, si usted tiene historia de surinder tenido un aborto involuntario, rios proveedor de shana le puede recomendar que tenga cuidado y que no tenga relaciones sexuales y orgasmos won el primer trimestre del Patti Prateek  4  Que puede pasar si siento que el mary jane se mueve despues de tener relaciones sexuales? No  El mary jane esta muy tory protegido en el utero  Y, usualmente el mary jane se mueve mucho sin importar lo que usted jason  5  Esta tory que mi sukumar ponga peso sobre mi estomago? No, especialmente cuando rios estomago empiece a crecer mas  Encuentren otras posiciones para tender relaciones sexuales won el embarazo  Traten de tenerrelaciones sexuales acostados de lado o usted puede ponerse encima de rios sukumar  No se acueste boca arriba  6  Tener relaciones sexuales puede causare un parto prematuro? Normalmente no  Sin embarago, los orgasmos causan que el utero tenga contracciones ligeras si usted esta cerca al [place] de peter a asif    Si usted tiene historia de haver tenido un parto prematuro, rios proveedor de [employer] a recomendar que no tenga relaciones sexuales y [place]  Tambien la estimulacion de los senos causa que el utero se contraiga si usted esta cerca del Mika de peter a asif  Preguentele a rios proveedor de shana si esto es seguro para usted  [address] son seguras cleopatra el Boris Clines? No   Nunca deje que rios sukumar le sople en rios vagina  No ponga ninjun objeto en rios vagina que le pueda hacer leland o causar kolton infeccion  Si usted tiene sangrado excesivo o si rios apolonia se rompe mientras esta teniendo [employer], detengase y llame a rios proveedor de shana inmediatamente  8  Que pasa si yo no tengo ganas de tener relaciones sexuales? Hable francamente con rios sukumar  Al comienzo del [facility] [address], usted puede estar muy cansada o puede sentirse mal del estomago  En las ultimas semanas del [facility] cambio fisicos que usted esta teniendo pueden hacerla sentir muy grand o muy incomoda para tener relaciones sexuales  Para mo tiempo puede que usted derrick pensando mas en la maternidad que en tener relaciones sexuales  9  Hay algunas ocasiones en las que debiera evitar tener relaciones sexuales? Si, si:  · tener relaciones sexuales es doloroso  · usted tiene sangrado vaginal  · usted tiene parto prematuro  · Rios apolonia se rompio  · usted esta embarazada con gemelos o mas  · usted o rios sukumar tienen cualquier otra enfermedad venera o kolton enfermedad transmitida por relaciones sexuales  · usted no puede encontrar kolton posicion comoda          Mazama CLEOPATRA EL EMBARAZO  Llame a Nunu Corrigan al [phone] para cualquiera de los siguientes:    1  sangrado vaginal  2  Dolor abdominal mickey que no desaparece  3  Fiebre (más de 100 4 y no se amaris con Tylenol)  4  Vómitos persistentes que miller más de 24 horas  5  Dolor de pecho  6  Dolor o ardor al orinar  7  Dolor de germán severo que no se resuelve con Tylenol  8  Visión borrosa o liza puntos en rios visión    9  Hinchazón repentina de rios issac o césar  10  Enrojecimiento, hinchazón o dolor en kolton pierna  11  Un aumento de peso repentino en pocos días  12  Contar los movimientos fetales del bebé  (después de 28 semanas o el sexto mes de embarazo)  15  Kolton pérdida de líquido acuoso de la vagina: puede ser un chorro, un goteo o kolton humedad continua  14  Después de 20 semanas de embarazo, calambres rítmicos (más de 4 por hora) o menstruales camilo dolor bajo / Sonna Ramos MATERNA     BENEFICIOS PARA LOS BEBÉS   · sistemas inmunológicos más lucila (menos alergias, eczema, asma y cáncer infantil)   · menos diarrea y estreñimiento u otras enfermedades GI   · menos resfriados e infecciones del oído   · mejor visión y dientes (menos cavidades)   · mejora el IQ   · menores tasas de diabetes y obesidad en la infancia     BENEFICIOS PARA LAS MADRES   · promueve la pérdida de peso más rápida después del parto   · rachid riesgo para la depresión postparto   · rachid riesgo para los cánceres [place], útero y ovario   · rachid riesgo para la osteoporosis en desarrollo con la edad   · siempre es más fácil que fórmula - correcto, limpio, y la temperatura adecuada   · menos costosa que la fórmula es gratis!!!     CLAVES PARA KOLTON LACTANCIA EXITOSA   · mantener bebé piel a piel hasta después de la primera alimentación evento   · tener a bebé en rios cuarto con usted won rios estadía en el hospital después del parto   · evitar cualquier botella de alimentación (a menos que sea médicamente necesario)   · limitar el uso de chupones y pañales   · Pida ayuda si usted está teniendo problemas    consultores de lactancia (que se especializan en la lactancia) están disponibles para ayudarle a   · kolton dieta saludable para mamá    comiendo kolton variedad de alimentos y raciones con moderación     COSAS QUE DEBES SABER SOBRE LA LACTANCIA   · mayoría de los medicamentos se considera compatible con la lactancia materna por [address] [facility] Meghann Thayer consultarlo con iros médico o en lactancia antes de amairani un medicamento nuevo    para estar seguro es seguro   · alcohol (cerveza, vino, licor) puede transmitirse de la madre al bebé a través de la Dani    un ocasional, social bebida es considerado aceptable por [address]    más que eso deben evitarse   · la lactancia materna es NO es un método para evitar embarazo   · nicotina (cigarrillos) puede pasar de la madre al bebé a través de la Dani    sin embargo, para las [employer], es aún más saludable para amamantar que use la fórmula   · cafeína debería limitarse a 1-3 tazas por día    incluye café, refrescos, bebidas energéticas           VACUNAS CLEOPATRA EL EMBARAZO    TDAP  La tos ferina (o tos [place]) puede ser grave para cualquier persona, roberto para rios recién nacido, puede ser mortal  Hasta 20 recién nacidos mueren cada año en los [place] debido a la tos [place]  Aproximadamente la mitad de los bebés menores de 1 año de edad que contraen tos ferina necesitan tratamiento en el hospital  Cuanto más joven es el bebé cuando él o leopoldo son la tos Prieto Will probable es que él o leopoldo tendrá que ser tratado en un hospital  Cuando usted recibe la vacuna contra la tos ferina (Tdap) cleopatra rios embarazo, rios cuerpo creará anticuerpos protectores y algunos de ellos pasan a rios bebé antes de nacer  Estos anticuerpos pueden ayudar a proteger a rios bebé contraigan la tos ferina hasta que tienen edad suficiente para recibir la vacuna ellos mismos (generalmente alrededor de 6 meses de edad)  INFLUENZA  Cambios en las funciones inmunológica, del corazón y pulmón cleopatra el embarazo te hacen más susceptible a padecer seriamente enfermo de la gripe  Coger la gripe también aumenta las posibilidades de problemas graves para rios bebé en desarrollo, incluyendo la entrega y el trabajo de [place]   Se recomienda que todas las mujeres que están embarazadas cleopatra la temporada de gripe deben recibir kolton vacuna contra la influenza  USO DE CINTURÓN DE SEGURIDAD EN EL EMBARAZO    Si usted está embarazada o no, deben usar el cinturón de seguridad cada vez que estás en un coche  El cinturón de seguridad es la mejor protección para ambos usted y rios sushma zaria  Para utilizar correctamente el cinturón de seguridad won el embarazo, puede que deba ajustar el asiento delantero varias veces camilo crece de modo que siempre hay por lo menos 10 pulgadas entre el centro de iros pecho y el clayton de manejar o del panel de control, y llegar cómodamente a los pedales  La smith del hombro deben colocar sobre el pecho (entre amanda senos) y lejos de rios merary  El cinturón de seguridad debe fijarse por debajo de rios vientre para que se ajuste cómodamente a través de las caderas y la pelvis ósea  NO debe desactivar la bolsas de aire de rios vehículo  Bolsas de aire y cinturones de seguridad funcionan mejor cuando se utilizan juntos para proteger a rios sushma del unborn

## 2021-07-21 ENCOUNTER — PATIENT OUTREACH (OUTPATIENT)
Dept: OBGYN CLINIC | Facility: CLINIC | Age: 26
End: 2021-07-21
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
## 2021-08-03 ENCOUNTER — INITIAL PRENATAL (OUTPATIENT)
Dept: OBGYN CLINIC | Facility: CLINIC | Age: 26
End: 2021-08-03

## 2021-08-03 VITALS — DIASTOLIC BLOOD PRESSURE: 67 MMHG | HEART RATE: 73 BPM | WEIGHT: 123.4 LBS | SYSTOLIC BLOOD PRESSURE: 105 MMHG

## 2021-08-03 DIAGNOSIS — O30.041 DICHORIONIC DIAMNIOTIC TWIN PREGNANCY IN FIRST TRIMESTER: ICD-10-CM

## 2021-08-03 DIAGNOSIS — Z3A.12 12 WEEKS GESTATION OF PREGNANCY: Primary | ICD-10-CM

## 2021-08-03 PROBLEM — O30.001 TWIN GESTATION IN FIRST TRIMESTER: Status: ACTIVE | Noted: 2021-08-03

## 2021-08-03 PROBLEM — Z34.90 PREGNANCY: Status: ACTIVE | Noted: 2021-08-03

## 2021-08-03 PROCEDURE — G0145 SCR C/V CYTO,THINLAYER,RESCR: HCPCS

## 2021-08-03 PROCEDURE — 87591 N.GONORRHOEAE DNA AMP PROB: CPT

## 2021-08-03 PROCEDURE — 87491 CHLMYD TRACH DNA AMP PROBE: CPT

## 2021-08-03 PROCEDURE — 99214 OFFICE O/P EST MOD 30 MIN: CPT | Performed by: OBSTETRICS & GYNECOLOGY

## 2021-08-03 RX ORDER — ONDANSETRON 4 MG/1
4 TABLET, ORALLY DISINTEGRATING ORAL EVERY 6 HOURS PRN
Qty: 30 TABLET | Refills: 3 | Status: SHIPPED | OUTPATIENT
Start: 2021-08-03 | End: 2021-08-20

## 2021-08-03 NOTE — PROGRESS NOTES
OB/GYN  PRENATAL H&P VISIT  Jerod Wright  8/3/2021  12:27 PM  Dr Alla Ware MD      SUBJECTIVE  Patient is a [id number] at 12w4d here for initial prenatal H&P  This is an intended and desired pregnancy  She is currently doing well  She does not work        She denies hx of STD/STI, denies a hx of TB or close contacts with persons with TB  She has not had MRSA  She denies a family history of inheritable conditions such as physical or intellectual disabilities, birth defects, blood disorders, heart or neural tube defects  She denies recent travel or travel planned in the near future  She denies use of nicotine or recreational drug use  She denies use of ETOH  She denies vaginal bleeding, cramping, leakage, abnormal discharge       OB History    Para Term  AB Living   3 2 2 0 0 2   SAB TAB Ectopic Multiple Live Births   0 0 0 0 2      # Outcome Date GA Lbr Kushal/2nd Weight Sex Delivery Anes PTL Lv   3 Current            2 Term 12/25/15 38w0d  3345 g (7 lb 6 oz) M Vag-Spont  N ALINE   1 Term 14 40w0d  3345 g (7 lb 6 oz) M Vag-Spont  N ALINE       Review of Systems    Past Medical History:   Diagnosis Date    Lupus ([facility] [address] )        Past Surgical History:   Procedure Laterality Date    CHOLECYSTECTOMY         Social History     Socioeconomic History    Marital status: /Civil Union     Spouse name: Not on file    Number of children: 2    Years of education: Not on file    Highest education level: Not on file   Occupational History    Not on file   Tobacco Use    Smoking status: Never Smoker    Smokeless tobacco: Never Used   Vaping Use    Vaping Use: Never used   Substance and Sexual Activity    Alcohol use: Not Currently    Drug use: Never    Sexual activity: Yes     Partners: Female   Other Topics Concern    Not on file   Social History Narrative    Not on file     Social Determinants of Health     Financial Resource Strain: Low Risk     Difficulty of Paying Living Expenses: Not hard at all   Food Insecurity: No Food Insecurity    Worried About [address] in the Last Year: Never true    Ran Out of Food in the Last Year: Never true   Transportation Needs: No Transportation Needs    Lack of Transportation (Medical): No    Lack of Transportation (Non-Medical): No   Physical Activity:     Days of Exercise per Week:     Minutes of Exercise per Session:    Stress: No Stress Concern Present    Feeling of Stress : Not at all   Social Connections:     Frequency of Communication with Friends and Family:     Frequency of Social Gatherings with Friends and Family:     Attends Church Services:     Active Member of Clubs or Organizations:     Attends Club or Organization Meetings:     Marital Status:    Intimate Partner Violence:     Fear of Current or Ex-Partner:     Emotionally Abused:     Physically Abused:     Sexually Abused:        OBJECTIVE  Vitals:    21 1148   BP: 105/67   Pulse: 68     OBGyn Exam    ASSESSMENT AND PLAN    22 y o , , with /67   Pulse 73   Wt 56 kg (123 lb 6 4 oz)   LMP 2021 (Exact Date) , at 12w4d here for her prenatal H&P  FHT by ARMANI  Baby A: 157 bpm  Baby B: 165 bpm    Pregnancy: H&P completed today  Emphasized the importance of getter her PN Labs done  Labor expectations discussed with patient, including appointment schedule, nutrition, exercise, medications, sexual intercourse, and nausea/vomiting  Patient's weight is 123 lbs  Recommended weight gain is 20-25lbs  Prescribed Zofran for nausea/vomiting, 4mg immediately when the patient wakes 30 min prior to eating and throughout the day as needed  Screening: Pap smear collected  GC/CT collected  Sequential screening reviewed with patient - will proceed with quad screen  Consents: Delivery process including potential OVD and  reviewed  Sign delivery consent form at 28 weeks      Labor: For analgesia, patient plans on natural     Contraception: Different methods of contraception were discussed with patient, including progesterone only oral pills, depo provera, nexplanon, mirena, and paragard  Patient would like to get a tubal at time of delivery if C/S is done  Need to sign MA-31  Follow up: RTC in 4 weeks  Precautions regarding labor, leakage, bleeding, and fetal movement reviewed      Omari Rodriguez MD  8/3/2021  12:27 PM

## 2021-08-05 LAB
C TRACH DNA SPEC QL NAA+PROBE: NEGATIVE
N GONORRHOEA DNA SPEC QL NAA+PROBE: NEGATIVE

## 2021-08-09 LAB
LAB AP GYN PRIMARY INTERPRETATION: NORMAL
Lab: NORMAL

## 2021-08-13 ENCOUNTER — APPOINTMENT (OUTPATIENT)
Dept: LAB | Facility: CLINIC | Age: 26
End: 2021-08-13

## 2021-08-13 DIAGNOSIS — Z34.91 PRENATAL CARE IN FIRST TRIMESTER: ICD-10-CM

## 2021-08-13 LAB
ABO GROUP BLD: NORMAL
BACTERIA UR QL AUTO: NORMAL /HPF
BASOPHILS # BLD AUTO: 0.02 THOUSANDS/ΜL (ref 0–0.1)
BASOPHILS NFR BLD AUTO: 0 % (ref 0–1)
BILIRUB UR QL STRIP: NEGATIVE
BLD GP AB SCN SERPL QL: NEGATIVE
CLARITY UR: ABNORMAL
COLOR UR: ABNORMAL
EOSINOPHIL # BLD AUTO: 0.23 THOUSAND/ΜL (ref 0–0.61)
EOSINOPHIL NFR BLD AUTO: 3 % (ref 0–6)
ERYTHROCYTE [DISTWIDTH] IN BLOOD BY AUTOMATED COUNT: 14 % (ref 11.6–15.1)
GLUCOSE 1H P 50 G GLC PO SERPL-MCNC: 98 MG/DL (ref 40–134)
GLUCOSE UR STRIP-MCNC: NEGATIVE MG/DL
HBV SURFACE AG SER QL: NORMAL
HCT VFR BLD AUTO: 34.1 % (ref 34.8–46.1)
HCV AB SER QL: NORMAL
HGB BLD-MCNC: 10.7 G/DL (ref 11.5–15.4)
HGB UR QL STRIP.AUTO: NEGATIVE
IMM GRANULOCYTES # BLD AUTO: 0.04 THOUSAND/UL (ref 0–0.2)
IMM GRANULOCYTES NFR BLD AUTO: 1 % (ref 0–2)
KETONES UR STRIP-MCNC: NEGATIVE MG/DL
LEUKOCYTE ESTERASE UR QL STRIP: ABNORMAL
LYMPHOCYTES # BLD AUTO: 1.42 THOUSANDS/ΜL (ref 0.6–4.47)
LYMPHOCYTES NFR BLD AUTO: 17 % (ref 14–44)
MCH RBC QN AUTO: 27 PG (ref 26.8–34.3)
MCHC RBC AUTO-ENTMCNC: 31.4 G/DL (ref 31.4–37.4)
MCV RBC AUTO: 86 FL (ref 82–98)
MONOCYTES # BLD AUTO: 0.59 THOUSAND/ΜL (ref 0.17–1.22)
MONOCYTES NFR BLD AUTO: 7 % (ref 4–12)
NEUTROPHILS # BLD AUTO: 5.93 THOUSANDS/ΜL (ref 1.85–7.62)
NEUTS SEG NFR BLD AUTO: 72 % (ref 43–75)
NITRITE UR QL STRIP: NEGATIVE
NON-SQ EPI CELLS URNS QL MICRO: NORMAL /HPF
NRBC BLD AUTO-RTO: 0 /100 WBCS
PH UR STRIP.AUTO: 6 [PH]
PLATELET # BLD AUTO: 275 THOUSANDS/UL (ref 149–390)
PMV BLD AUTO: 12.7 FL (ref 8.9–12.7)
PROT UR STRIP-MCNC: NEGATIVE MG/DL
RBC # BLD AUTO: 3.97 MILLION/UL (ref 3.81–5.12)
RBC #/AREA URNS AUTO: NORMAL /HPF
RH BLD: POSITIVE
RUBV IGG SERPL IA-ACNC: 135.5 IU/ML
SP GR UR STRIP.AUTO: 1.02 (ref 1–1.03)
SPECIMEN EXPIRATION DATE: NORMAL
UROBILINOGEN UR QL STRIP.AUTO: 0.2 E.U./DL
WBC # BLD AUTO: 8.23 THOUSAND/UL (ref 4.31–10.16)
WBC #/AREA URNS AUTO: NORMAL /HPF

## 2021-08-13 PROCEDURE — 83020 HEMOGLOBIN ELECTROPHORESIS: CPT

## 2021-08-13 PROCEDURE — 81001 URINALYSIS AUTO W/SCOPE: CPT

## 2021-08-13 PROCEDURE — 86803 HEPATITIS C AB TEST: CPT

## 2021-08-13 PROCEDURE — 82950 GLUCOSE TEST: CPT

## 2021-08-13 PROCEDURE — 80081 OBSTETRIC PANEL INC HIV TSTG: CPT

## 2021-08-13 PROCEDURE — 87086 URINE CULTURE/COLONY COUNT: CPT

## 2021-08-13 PROCEDURE — 36415 COLL VENOUS BLD VENIPUNCTURE: CPT

## 2021-08-14 LAB — BACTERIA UR CULT: NORMAL

## 2021-08-15 LAB — HIV 1+2 AB+HIV1 P24 AG SERPL QL IA: NORMAL

## 2021-08-16 LAB — RPR SER QL: NORMAL

## 2021-08-18 LAB
HGB A MFR BLD: 2.2 % (ref 1.8–3.2)
HGB A MFR BLD: 97.8 % (ref 96.4–98.8)
HGB F MFR BLD: 0 % (ref 0–2)
HGB FRACT BLD-IMP: NORMAL
HGB S MFR BLD: 0 %

## 2021-08-20 ENCOUNTER — APPOINTMENT (OUTPATIENT)
Dept: LAB | Facility: CLINIC | Age: 26
End: 2021-08-20

## 2021-08-20 ENCOUNTER — ROUTINE PRENATAL (OUTPATIENT)
Dept: OBGYN CLINIC | Facility: CLINIC | Age: 26
End: 2021-08-20

## 2021-08-20 VITALS
DIASTOLIC BLOOD PRESSURE: 67 MMHG | BODY MASS INDEX: 22.82 KG/M2 | WEIGHT: 124 LBS | HEART RATE: 102 BPM | SYSTOLIC BLOOD PRESSURE: 114 MMHG | HEIGHT: 62 IN

## 2021-08-20 DIAGNOSIS — Z3A.15 15 WEEKS GESTATION OF PREGNANCY: ICD-10-CM

## 2021-08-20 DIAGNOSIS — Z34.92 PRENATAL CARE IN SECOND TRIMESTER: Primary | ICD-10-CM

## 2021-08-20 DIAGNOSIS — O99.012 ANEMIA DURING PREGNANCY IN SECOND TRIMESTER: ICD-10-CM

## 2021-08-20 DIAGNOSIS — O30.042 DICHORIONIC DIAMNIOTIC TWIN PREGNANCY IN SECOND TRIMESTER: ICD-10-CM

## 2021-08-20 DIAGNOSIS — Z34.92 PRENATAL CARE IN SECOND TRIMESTER: ICD-10-CM

## 2021-08-20 PROBLEM — O30.049 DICHORIONIC DIAMNIOTIC TWIN PREGNANCY: Status: ACTIVE | Noted: 2021-08-03

## 2021-08-20 PROBLEM — O99.019 ANEMIA IN PREGNANCY: Status: ACTIVE | Noted: 2021-08-20

## 2021-08-20 PROCEDURE — 82105 ALPHA-FETOPROTEIN SERUM: CPT

## 2021-08-20 PROCEDURE — 84702 CHORIONIC GONADOTROPIN TEST: CPT

## 2021-08-20 PROCEDURE — 82677 ASSAY OF ESTRIOL: CPT

## 2021-08-20 PROCEDURE — 86336 INHIBIN A: CPT

## 2021-08-20 PROCEDURE — 99213 OFFICE O/P EST LOW 20 MIN: CPT | Performed by: NURSE PRACTITIONER

## 2021-08-20 RX ORDER — PNV NO.95/FERROUS FUM/FOLIC AC 28MG-0.8MG
1 TABLET ORAL DAILY
Qty: 90 TABLET | Refills: 4 | Status: CANCELLED | OUTPATIENT
Start: 2021-08-20

## 2021-08-20 RX ORDER — ASPIRIN 81 MG/1
162 TABLET ORAL DAILY
Qty: 60 TABLET | Refills: 9 | Status: SHIPPED | OUTPATIENT
Start: 2021-08-20 | End: 2022-01-22 | Stop reason: HOSPADM

## 2021-08-20 RX ORDER — FERROUS SULFATE TAB EC 324 MG (65 MG FE EQUIVALENT) 324 (65 FE) MG
324 TABLET DELAYED RESPONSE ORAL
Qty: 30 TABLET | Refills: 9 | Status: SHIPPED | OUTPATIENT
Start: 2021-08-20 | End: 2022-01-22 | Stop reason: HOSPADM

## 2021-08-20 NOTE — PATIENT INSTRUCTIONS
SENALES CLEOPATRA EL EMBARAZO  Llame a nuestra oficina al [phone] para cualquiera de los siguientes:    1  sangrado vaginal  2  Dolor abdominal mickey que no desaparece  3  Fiebre (más de 100 4 y no se amaris con Tylenol)  4  Vómitos persistentes que miller más de 24 horas  5  dolor de pecho  6  Dolor o ardor al orinar  7  Dolor de germán severo que no se resuelve con Tylenol  8  Visión borrosa o liza puntos en rios visión  9  Hinchazón repentina de rios issac o césar  10  Enrojecimiento, hinchazón o dolor en kolton pierna  11  Un aumento de peso repentino en pocos días  12  Contar los movimientos fetales del bebé  (después de 28 semanas o el sexto mes de embarazo)  15  Kolton pérdida de líquido acuoso de la vagina: puede ser un chorro, un goteo o kolton humedad continua  14   Después de 20 semanas de embarazo, calambres rítmicos (más de 4 por hora) o menstruales camilo dolor Nancine Edin / Isamar Honeycutt

## 2021-08-20 NOTE — PROGRESS NOTES
Haris Ribeiro presents today for routine OB visit at 15w1d  Blood Pressure: 114/67  Wt=56 2 kg (124 lb); Body mass index is 22 68 kg/m² ; TWG=1 814 kg (4 lb)  Fetal Heart Rate: 150/158  Abdomen: gravid, soft, non-tender  She reports n/v resolved  Denies uterine contractions or persistent cramping  Denies vaginal bleeding or leaking of fluid  Scheduled for ultrasound 9/29/21  Reviewed common discomforts of pregnancy in second trimester and warning signs  Advised to continue medications and return in 4 weeks        Current Outpatient Medications   Medication Instructions    aspirin (ECOTRIN LOW STRENGTH) 162 mg, Oral, Daily    ferrous sulfate 324 mg, Oral, Daily before breakfast    Prenatal Vit-Fe Fumarate-FA (PRENATAL VITAMINS PO) Oral       Laboratory workup: initial OB labs (done 8/13/21)    Genetic Screening: desires Quad screen (ordered 8/20/21)    Vaccinations: will offer influenza vaccine during flu season; will offer Tdap after 27 weeks    Postpartum contraception: desires surgical sterilization (needs to sign MA31 @28 weeks)    Fetal Ultrasounds:  7/8/21 (9w0d) EDC confirmed        G3 Problems (from 07/19/21 to present)     Problem Noted Resolved    Anemia in pregnancy 8/20/2021 by ANDREAS Smith No    Overview Signed 8/20/2021  8:44 AM by ANDREAS Smith     Needs Fe supp - given Rx 8/20/21         Dichorionic diamniotic twin pregnancy 8/3/2021 by Tigre Givens MD No    Overview Signed 8/20/2021  8:51 AM by ANDREAS Smith     Needs early GDM screen - done WNL  Needs LDASA tx - given Rx 8/20/21  Needs AFS once weekly @36 weeks  Needs delivery @37-39 weeks

## 2021-08-21 LAB
2ND TRIMESTER 4 SCREEN SERPL-IMP: NORMAL
2ND TRIMESTER 4 SCREEN SERPL-IMP: NORMAL
AFP ADJ MOM SERPL: 2.46
AFP SERPL-MCNC: 85.5 NG/ML
AGE AT DELIVERY: 26.2 YR
FET TS 18 RISK FROM MAT AGE: NORMAL
FET TS 21 RISK FROM MAT AGE: 971
GA METHOD: NORMAL
GA: 15.1 WEEKS
HCG ADJ MOM SERPL: 2.55
HCG SERPL-ACNC: NORMAL MIU/ML
IDDM PATIENT QL: NO
INHIBIN A ADJ MOM SERPL: 2.37
INHIBIN A SERPL-MCNC: 456.09 PG/ML
KARYOTYP BLD/T: NORMAL
MULTIPLE PREGNANCY: NORMAL
NEURAL TUBE DEFECT RISK FETUS: 760 %
SERVICE CMNT-IMP: NORMAL
TS 18 RISK FETUS: NORMAL
TS 21 RISK FETUS: 8402
U ESTRIOL ADJ MOM SERPL: 2.19
U ESTRIOL SERPL-MCNC: 1.81 NG/ML

## 2021-09-16 ENCOUNTER — ROUTINE PRENATAL (OUTPATIENT)
Dept: OBGYN CLINIC | Facility: CLINIC | Age: 26
End: 2021-09-16

## 2021-09-16 VITALS
SYSTOLIC BLOOD PRESSURE: 106 MMHG | HEART RATE: 88 BPM | WEIGHT: 132 LBS | BODY MASS INDEX: 24.14 KG/M2 | DIASTOLIC BLOOD PRESSURE: 67 MMHG

## 2021-09-16 DIAGNOSIS — Z3A.19 19 WEEKS GESTATION OF PREGNANCY: Primary | ICD-10-CM

## 2021-09-16 DIAGNOSIS — O30.042 DICHORIONIC DIAMNIOTIC TWIN PREGNANCY IN SECOND TRIMESTER: ICD-10-CM

## 2021-09-16 PROCEDURE — 99214 OFFICE O/P EST MOD 30 MIN: CPT | Performed by: OBSTETRICS & GYNECOLOGY

## 2021-09-16 NOTE — PROGRESS NOTES
[facility]  [id number]  1995      A/P:  - Continue PNV  - Labor precautions reviewed  - Fetal kick counts reviewed  - Ultrasounds: Level 2 on 9/29/21  - Flu Shot: recommended  - COVID vaccine: does not want  - RTO in 4 weeks    Problem List        Other    19 weeks gestation of pregnancy    Overview     QUAD screen normal         Dichorionic diamniotic twin pregnancy    Overview     Needs early GDM screen - done WNL  Needs LDASA tx - given Rx 8/20/21  Needs AFS once weekly @36 weeks  Needs delivery @37-39 weeks         Anemia in pregnancy    Overview     Needs Fe supp - given Rx 8/20/21                  S: 22 y o  [id number] 19w0d here for PN visit  She has no contractions  She has no leakage of fluid and vaginal bleeding  She has good fetal movement       O:  Vitals:    09/16/21 1130   BP: 106/67   Pulse: 88       Fetal Heart Rate: 150       D/w Dr Rochelle Valenzuela MD  PGY-1   9/16/2021  12:17 PM

## 2021-09-29 ENCOUNTER — ULTRASOUND (OUTPATIENT)
Dept: PERINATAL CARE | Facility: OTHER | Age: 26
End: 2021-09-29

## 2021-09-29 VITALS
WEIGHT: 136.2 LBS | HEIGHT: 62 IN | DIASTOLIC BLOOD PRESSURE: 70 MMHG | HEART RATE: 93 BPM | SYSTOLIC BLOOD PRESSURE: 112 MMHG | BODY MASS INDEX: 25.06 KG/M2

## 2021-09-29 DIAGNOSIS — Z36.86 ENCOUNTER FOR ANTENATAL SCREENING FOR CERVICAL LENGTH: ICD-10-CM

## 2021-09-29 DIAGNOSIS — Z3A.20 20 WEEKS GESTATION OF PREGNANCY: ICD-10-CM

## 2021-09-29 DIAGNOSIS — O30.042 DICHORIONIC DIAMNIOTIC TWIN PREGNANCY IN SECOND TRIMESTER: Primary | ICD-10-CM

## 2021-09-29 PROCEDURE — 76811 OB US DETAILED SNGL FETUS: CPT | Performed by: OBSTETRICS & GYNECOLOGY

## 2021-09-29 PROCEDURE — 76812 OB US DETAILED ADDL FETUS: CPT | Performed by: OBSTETRICS & GYNECOLOGY

## 2021-09-29 PROCEDURE — 99241 PR OFFICE CONSULTATION NEW/ESTAB PATIENT 15 MIN: CPT | Performed by: OBSTETRICS & GYNECOLOGY

## 2021-09-29 PROCEDURE — 76817 TRANSVAGINAL US OBSTETRIC: CPT | Performed by: OBSTETRICS & GYNECOLOGY

## 2021-09-29 RX ORDER — PHENOL 1.4 %
600 AEROSOL, SPRAY (ML) MUCOUS MEMBRANE 2 TIMES DAILY WITH MEALS
Qty: 60 TABLET | Refills: 6 | Status: SHIPPED | OUTPATIENT
Start: 2021-09-29 | End: 2022-01-22 | Stop reason: HOSPADM

## 2021-10-15 ENCOUNTER — ROUTINE PRENATAL (OUTPATIENT)
Dept: OBGYN CLINIC | Facility: CLINIC | Age: 26
End: 2021-10-15

## 2021-10-15 VITALS
DIASTOLIC BLOOD PRESSURE: 70 MMHG | HEART RATE: 101 BPM | SYSTOLIC BLOOD PRESSURE: 115 MMHG | BODY MASS INDEX: 25.42 KG/M2 | WEIGHT: 139 LBS

## 2021-10-15 DIAGNOSIS — Z3A.23 23 WEEKS GESTATION OF PREGNANCY: ICD-10-CM

## 2021-10-15 DIAGNOSIS — O30.042 DICHORIONIC DIAMNIOTIC TWIN PREGNANCY IN SECOND TRIMESTER: ICD-10-CM

## 2021-10-15 DIAGNOSIS — Z34.92 PRENATAL CARE IN SECOND TRIMESTER: Primary | ICD-10-CM

## 2021-10-15 DIAGNOSIS — O99.012 ANEMIA DURING PREGNANCY IN SECOND TRIMESTER: ICD-10-CM

## 2021-10-15 PROCEDURE — 90686 IIV4 VACC NO PRSV 0.5 ML IM: CPT

## 2021-10-15 PROCEDURE — 99213 OFFICE O/P EST LOW 20 MIN: CPT | Performed by: NURSE PRACTITIONER

## 2021-10-15 PROCEDURE — 90471 IMMUNIZATION ADMIN: CPT

## 2021-11-11 ENCOUNTER — ROUTINE PRENATAL (OUTPATIENT)
Dept: OBGYN CLINIC | Facility: CLINIC | Age: 26
End: 2021-11-11

## 2021-11-11 VITALS
WEIGHT: 151 LBS | SYSTOLIC BLOOD PRESSURE: 105 MMHG | BODY MASS INDEX: 27.62 KG/M2 | HEART RATE: 98 BPM | DIASTOLIC BLOOD PRESSURE: 71 MMHG

## 2021-11-11 DIAGNOSIS — O30.042 DICHORIONIC DIAMNIOTIC TWIN PREGNANCY IN SECOND TRIMESTER: ICD-10-CM

## 2021-11-11 DIAGNOSIS — O99.012 ANEMIA DURING PREGNANCY IN SECOND TRIMESTER: ICD-10-CM

## 2021-11-11 DIAGNOSIS — Z3A.28 28 WEEKS GESTATION OF PREGNANCY: ICD-10-CM

## 2021-11-11 DIAGNOSIS — Z34.92 PRENATAL CARE IN SECOND TRIMESTER: ICD-10-CM

## 2021-11-11 DIAGNOSIS — Z3A.27 27 WEEKS GESTATION OF PREGNANCY: Primary | ICD-10-CM

## 2021-11-11 PROBLEM — Z30.2 REQUEST FOR STERILIZATION: Status: ACTIVE | Noted: 2021-11-11

## 2021-11-11 PROCEDURE — 90471 IMMUNIZATION ADMIN: CPT

## 2021-11-11 PROCEDURE — 99213 OFFICE O/P EST LOW 20 MIN: CPT | Performed by: OBSTETRICS & GYNECOLOGY

## 2021-11-11 PROCEDURE — 90715 TDAP VACCINE 7 YRS/> IM: CPT

## 2021-11-22 ENCOUNTER — APPOINTMENT (OUTPATIENT)
Dept: LAB | Facility: CLINIC | Age: 26
End: 2021-11-22

## 2021-11-22 ENCOUNTER — ULTRASOUND (OUTPATIENT)
Dept: PERINATAL CARE | Facility: OTHER | Age: 26
End: 2021-11-22

## 2021-11-22 VITALS
HEIGHT: 62 IN | HEART RATE: 108 BPM | BODY MASS INDEX: 28.41 KG/M2 | SYSTOLIC BLOOD PRESSURE: 106 MMHG | DIASTOLIC BLOOD PRESSURE: 69 MMHG | WEIGHT: 154.4 LBS

## 2021-11-22 DIAGNOSIS — O30.043 DICHORIONIC DIAMNIOTIC TWIN PREGNANCY IN THIRD TRIMESTER: ICD-10-CM

## 2021-11-22 DIAGNOSIS — O40.3XX2 POLYHYDRAMNIOS, THIRD TRIMESTER, FETUS 2: Primary | ICD-10-CM

## 2021-11-22 DIAGNOSIS — Z36.2 ENCOUNTER FOR FOLLOW-UP ULTRASOUND OF FETAL ANATOMY: ICD-10-CM

## 2021-11-22 DIAGNOSIS — O32.9XX1 MALPRESENTATION BEFORE ONSET OF LABOR, FETUS 1 OF MULTIPLE GESTATION: ICD-10-CM

## 2021-11-22 DIAGNOSIS — Z3A.28 28 WEEKS GESTATION OF PREGNANCY: ICD-10-CM

## 2021-11-22 DIAGNOSIS — Z36.89 ENCOUNTER FOR ULTRASOUND TO CHECK FETAL GROWTH: ICD-10-CM

## 2021-11-22 PROBLEM — O32.9XX0 MALPRESENTATION BEFORE ONSET OF LABOR: Status: ACTIVE | Noted: 2021-11-22

## 2021-11-22 LAB
ERYTHROCYTE [DISTWIDTH] IN BLOOD BY AUTOMATED COUNT: 14.2 % (ref 11.6–15.1)
GLUCOSE 1H P 50 G GLC PO SERPL-MCNC: 124 MG/DL (ref 40–134)
HCT VFR BLD AUTO: 32.5 % (ref 34.8–46.1)
HGB BLD-MCNC: 10.1 G/DL (ref 11.5–15.4)
MCH RBC QN AUTO: 28.3 PG (ref 26.8–34.3)
MCHC RBC AUTO-ENTMCNC: 31.1 G/DL (ref 31.4–37.4)
MCV RBC AUTO: 91 FL (ref 82–98)
PLATELET # BLD AUTO: 227 THOUSANDS/UL (ref 149–390)
PMV BLD AUTO: 12.9 FL (ref 8.9–12.7)
RBC # BLD AUTO: 3.57 MILLION/UL (ref 3.81–5.12)
WBC # BLD AUTO: 9.56 THOUSAND/UL (ref 4.31–10.16)

## 2021-11-22 PROCEDURE — 76819 FETAL BIOPHYS PROFIL W/O NST: CPT | Performed by: OBSTETRICS & GYNECOLOGY

## 2021-11-22 PROCEDURE — 76816 OB US FOLLOW-UP PER FETUS: CPT | Performed by: OBSTETRICS & GYNECOLOGY

## 2021-11-22 PROCEDURE — 99214 OFFICE O/P EST MOD 30 MIN: CPT | Performed by: OBSTETRICS & GYNECOLOGY

## 2021-11-22 PROCEDURE — 36415 COLL VENOUS BLD VENIPUNCTURE: CPT

## 2021-11-22 PROCEDURE — 82950 GLUCOSE TEST: CPT

## 2021-11-22 PROCEDURE — 86592 SYPHILIS TEST NON-TREP QUAL: CPT

## 2021-11-22 PROCEDURE — 85027 COMPLETE CBC AUTOMATED: CPT

## 2021-11-23 LAB — RPR SER QL: NORMAL

## 2021-11-30 ENCOUNTER — ROUTINE PRENATAL (OUTPATIENT)
Dept: PERINATAL CARE | Facility: OTHER | Age: 26
End: 2021-11-30

## 2021-11-30 VITALS
DIASTOLIC BLOOD PRESSURE: 73 MMHG | HEIGHT: 62 IN | HEART RATE: 97 BPM | BODY MASS INDEX: 28.85 KG/M2 | WEIGHT: 156.8 LBS | SYSTOLIC BLOOD PRESSURE: 111 MMHG

## 2021-11-30 DIAGNOSIS — Z3A.29 29 WEEKS GESTATION OF PREGNANCY: ICD-10-CM

## 2021-11-30 DIAGNOSIS — O30.043 DICHORIONIC DIAMNIOTIC TWIN PREGNANCY IN THIRD TRIMESTER: Primary | ICD-10-CM

## 2021-11-30 DIAGNOSIS — Z03.71 SUSPECTED PROBLEM WITH AMNIOTIC CAVITY AND MEMBRANE NOT FOUND: ICD-10-CM

## 2021-11-30 PROCEDURE — 76819 FETAL BIOPHYS PROFIL W/O NST: CPT | Performed by: OBSTETRICS & GYNECOLOGY

## 2021-11-30 PROCEDURE — 76815 OB US LIMITED FETUS(S): CPT | Performed by: OBSTETRICS & GYNECOLOGY

## 2021-11-30 PROCEDURE — 99212 OFFICE O/P EST SF 10 MIN: CPT | Performed by: OBSTETRICS & GYNECOLOGY

## 2021-12-01 PROBLEM — O40.9XX0 POLYHYDRAMNIOS: Status: ACTIVE | Noted: 2021-12-01

## 2021-12-15 ENCOUNTER — TELEPHONE (OUTPATIENT)
Dept: PERINATAL CARE | Facility: CLINIC | Age: 26
End: 2021-12-15

## 2021-12-17 ENCOUNTER — ROUTINE PRENATAL (OUTPATIENT)
Dept: OBGYN CLINIC | Facility: CLINIC | Age: 26
End: 2021-12-17

## 2021-12-17 VITALS
DIASTOLIC BLOOD PRESSURE: 73 MMHG | WEIGHT: 156 LBS | HEART RATE: 106 BPM | SYSTOLIC BLOOD PRESSURE: 113 MMHG | BODY MASS INDEX: 28.53 KG/M2

## 2021-12-17 DIAGNOSIS — Z34.93 PRENATAL CARE IN THIRD TRIMESTER: Primary | ICD-10-CM

## 2021-12-17 DIAGNOSIS — O99.013 ANEMIA DURING PREGNANCY IN THIRD TRIMESTER: ICD-10-CM

## 2021-12-17 DIAGNOSIS — Z3A.32 32 WEEKS GESTATION OF PREGNANCY: ICD-10-CM

## 2021-12-17 DIAGNOSIS — O30.043 DICHORIONIC DIAMNIOTIC TWIN PREGNANCY IN THIRD TRIMESTER: ICD-10-CM

## 2021-12-17 PROCEDURE — 99213 OFFICE O/P EST LOW 20 MIN: CPT | Performed by: NURSE PRACTITIONER

## 2021-12-22 ENCOUNTER — ULTRASOUND (OUTPATIENT)
Dept: PERINATAL CARE | Facility: OTHER | Age: 26
End: 2021-12-22

## 2021-12-22 VITALS
SYSTOLIC BLOOD PRESSURE: 108 MMHG | DIASTOLIC BLOOD PRESSURE: 76 MMHG | BODY MASS INDEX: 28.78 KG/M2 | HEART RATE: 96 BPM | HEIGHT: 62 IN | WEIGHT: 156.4 LBS

## 2021-12-22 DIAGNOSIS — O40.3XX1 POLYHYDRAMNIOS IN THIRD TRIMESTER COMPLICATION, FETUS 1 OF MULTIPLE GESTATION: ICD-10-CM

## 2021-12-22 DIAGNOSIS — Z3A.32 32 WEEKS GESTATION OF PREGNANCY: ICD-10-CM

## 2021-12-22 DIAGNOSIS — O30.043 DICHORIONIC DIAMNIOTIC TWIN PREGNANCY IN THIRD TRIMESTER: Primary | ICD-10-CM

## 2021-12-22 PROCEDURE — 99214 OFFICE O/P EST MOD 30 MIN: CPT | Performed by: OBSTETRICS & GYNECOLOGY

## 2021-12-22 PROCEDURE — 76816 OB US FOLLOW-UP PER FETUS: CPT | Performed by: OBSTETRICS & GYNECOLOGY

## 2021-12-22 PROCEDURE — 76819 FETAL BIOPHYS PROFIL W/O NST: CPT | Performed by: OBSTETRICS & GYNECOLOGY

## 2022-01-06 ENCOUNTER — ROUTINE PRENATAL (OUTPATIENT)
Dept: OBGYN CLINIC | Facility: CLINIC | Age: 27
End: 2022-01-06

## 2022-01-06 VITALS
HEART RATE: 88 BPM | BODY MASS INDEX: 28.35 KG/M2 | DIASTOLIC BLOOD PRESSURE: 78 MMHG | WEIGHT: 155 LBS | SYSTOLIC BLOOD PRESSURE: 120 MMHG

## 2022-01-06 DIAGNOSIS — Z3A.35 35 WEEKS GESTATION OF PREGNANCY: Primary | ICD-10-CM

## 2022-01-06 PROCEDURE — 87150 DNA/RNA AMPLIFIED PROBE: CPT

## 2022-01-06 PROCEDURE — 99214 OFFICE O/P EST MOD 30 MIN: CPT | Performed by: OBSTETRICS & GYNECOLOGY

## 2022-01-06 NOTE — PROGRESS NOTES
[facility]  [id number]  1995        A/P:  - Continue PNV  - Labor precautions reviewed  - Fetal kick counts reviewed  - RTO in 1 week for AFS    Problem List        Other    32 weeks gestation of pregnancy    35 weeks gestation of pregnancy    Overview     GBS collected 1/6/2022  Needs early GDM screen - done WNL  Needs LDASA tx - discontinue at 36 weeks  Serial growth scans  Needs AFS once weekly @36 weeks  Needs delivery @38 weeks         Anemia in pregnancy    Overview     Needs Fe supp - taking         Polyhydramnios    Overview     Noted @28 weeks of twin B (11/22/21)  GDM screen WNL 11/22/21  RESOLVED @29 weeks                S: 32 y o  [id number] 35w0d here for PN visit  She has no contractions  She has no leakage of fluid and vaginal bleeding  She has good fetal movement  She is doing well otherwise      O:  Vitals:    01/06/22 1504   BP: 120/78   Pulse: 88       Fetal Heart Rate: 145/142    D/w Dr Samuel Velasco MD  PGY-1   1/6/2022  4:24 PM

## 2022-01-07 LAB — GP B STREP DNA SPEC QL NAA+PROBE: NEGATIVE

## 2022-01-13 ENCOUNTER — ROUTINE PRENATAL (OUTPATIENT)
Dept: OBGYN CLINIC | Facility: CLINIC | Age: 27
End: 2022-01-13

## 2022-01-13 VITALS
BODY MASS INDEX: 28.53 KG/M2 | WEIGHT: 156 LBS | DIASTOLIC BLOOD PRESSURE: 72 MMHG | SYSTOLIC BLOOD PRESSURE: 107 MMHG | HEART RATE: 88 BPM

## 2022-01-13 DIAGNOSIS — O30.043 DICHORIONIC DIAMNIOTIC TWIN PREGNANCY IN THIRD TRIMESTER: ICD-10-CM

## 2022-01-13 DIAGNOSIS — Z3A.36 36 WEEKS GESTATION OF PREGNANCY: ICD-10-CM

## 2022-01-13 DIAGNOSIS — O40.3XX1 POLYHYDRAMNIOS IN THIRD TRIMESTER COMPLICATION, FETUS 1 OF MULTIPLE GESTATION: ICD-10-CM

## 2022-01-13 DIAGNOSIS — O99.013 ANEMIA DURING PREGNANCY IN THIRD TRIMESTER: ICD-10-CM

## 2022-01-13 DIAGNOSIS — Z30.2 REQUEST FOR STERILIZATION: Primary | ICD-10-CM

## 2022-01-13 PROBLEM — Z3A.32 32 WEEKS GESTATION OF PREGNANCY: Status: RESOLVED | Noted: 2021-08-03 | Resolved: 2022-01-13

## 2022-01-13 PROCEDURE — 99213 OFFICE O/P EST LOW 20 MIN: CPT | Performed by: OBSTETRICS & GYNECOLOGY

## 2022-01-13 PROCEDURE — 76815 OB US LIMITED FETUS(S): CPT | Performed by: OBSTETRICS & GYNECOLOGY

## 2022-01-13 PROCEDURE — 59025 FETAL NON-STRESS TEST: CPT | Performed by: OBSTETRICS & GYNECOLOGY

## 2022-01-13 NOTE — PROGRESS NOTES
OB/GYN  PN Visit  Janeal Fleischer  [id number]  2022  11:52 AM  Dr Luciano Tran MD    S: 32 y o  [id number] 36w0d here for PN visit  She reports occasional but irregular contractions though she did feel some on the monitor today  She denies leakage of fluid and vaginal bleeding  She reports good fetal movement  Her pregnancy is complicated by Di-Di TIUP  O:  Vitals:    22 1039   BP: 107/72   Pulse: 88     Physical Exam  Vitals reviewed  Constitutional:       General: She is not in acute distress  Appearance: Normal appearance  She is well-developed  She is not ill-appearing, toxic-appearing or diaphoretic  Cardiovascular:      Rate and Rhythm: Normal rate  Pulmonary:      Effort: Pulmonary effort is normal    Abdominal:      Palpations: Abdomen is soft  Genitourinary:     Comments: Gravid, nontender  NST:   Baby A: 150bpm, moderate, accelerations, no decelerations  Baby B: 135bpm, moderate, accelerations, no decelerations  SUZI:   Baby A: 4 36cm  Baby B: 5 97 cm  Skin:     General: Skin is warm and dry  Neurological:      Mental Status: She is alert and oriented to person, place, and time     Psychiatric:         Behavior: Behavior normal        A/P:    Problem List        Unprioritized    36 weeks gestation of pregnancy    Overview     - Continue PNV  - Labor precautions reviewed  - Fetal kick counts reviewed  - Labs: wnl  - Genetic screening: Quad screen neg  - Tdap: Administered 2021  - Flu Shot: Administered 10/2021  - COVID vaccine: Declined despite counseling  - Delivery:  without epidural unless necessary  - Contraception: Sterilization  - RTO in 1 week           Anemia in pregnancy    Overview     Needs Fe supp - taking         Polyhydramnios    Overview     Noted @28 weeks of twin B (21)  GDM screen WNL 21  RESOLVED @29 weeks         Dichorionic diamniotic twin pregnancy in third trimester    Overview     Needs early GDM screen - done WNL  Needs LDASA tx - discontinue at 36 weeks  Serial growth scans  Needs AFS once weekly @36 weeks  Needs delivery @38 weeks         Request for sterilization    Overview     MA 31 signed 21  Discussed that surgical sterilization is a PERMANENT and IRREVERSIBLE surgical procedure; pt demonstrated understanding and continued desire to proceed  Patient is okay if she does not have any more children, even if she dates a different partner or loses her children                 Future Appointments   Date Time Provider Niharika Santiago   2022  1:00 PM [id number] [facility]   2022  1:45 PM  [address]   2022 11:15 AM Skylar Patrick, [address] [place] [address] & NURSING HOME   2022  9:30 AM Yoselin Newton MD [place] [address] Angelika Wheeler MD  2022  11:52 AM

## 2022-01-13 NOTE — PATIENT INSTRUCTIONS
Conteo de patadas en el embarazo   LO QUE NECESITA SABER:   El conteo de patadas mide cuánto se está moviendo rios bebé en el útero  Kolton patada de rios bebé podría sentirse camilo kolton torcedura, kolton vuelta, un crujido, un meneo o un golpe  Es común sentir a tu bebé patear a las 32 a 29 semanas de [place]  Es posible que sienta al bebé patear ya a las 20 semanas de [place]  Puede que desee empezar a contar a las 28 semanas  INSTRUCCIONES SOBRE EL KEVYN HOSPITALARIA:   Comuníquese con rios médico de inmediato si:  · Usted siente un cambio en el número de patadas o movimientos de rios bebé  · Siente menos de 10 patadas en 2 horas  · Usted tiene preguntas o inquietudes acerca de los movimientos de rios bebé  Por qué realizar el conteo de patadas: Los movimientos de rios bebé podrían proporcionar información de la shana de rios bebé  Es posible que si hay problemas, rios bebé se mueva menos o nada en lo absoluto  El bebé podría moverse menos si no recibe suficiente oxígeno o alimento de la placenta  No fume mientras está embarazada  Fumar disminuye la cantidad de oxígeno que llega a rios bebé  Hable con rios médico si necesita ayuda para dejar de fumar  Los problemas que se encuentran en kolton etapa más temprana son más fáciles de tratar  Cuándo realizar el conteo de patadas:  · Cuente las patadas en el mismo horario todos los GRASSE  · Realice el conteo de las patadas cuando rios bebé esté despierto y [place]  Rios bebé podría estar más activo en la tarde  Cómo realizar el conteo de patadas: Revise que rios bebé esté despierto antes de realizar el conteo de patadas  Usted puede despertar a rios bebé empujando rios estómago suavemente, caminando o tomando algo frío  Rios médico podría indicarle diferentes maneras de realizar el conteo  Es posible que le indique que jason lo siguiente:  · Use kolton gráfica o un reloj para mantener un registro de la hora en que comienza y termina de contar      · Siéntese en kolton silla o acuéstese en rios costado derecho  · Coloque amanda césar en la parte más ham de rios BJURHOLM  · Cuente hasta que llegue a las 10 patadas  Escriba cuánto tiempo le lleva contar las 10 patadas  · Podría amairani de 30 minutos a 2 horas para contar 10 patadas  No debería de amairani más de 2 horas para contar 10 patadas  Acuda a la consulta de control con rios médico según las indicaciones: Anote amanda preguntas para que se acuerde de hacerlas won amanda visitas  © [employer] 2021 Information is for End User's use only and may not be sold, redistributed or otherwise used for commercial purposes  All illustrations and images included in CareNotes® are the copyrighted property of [employer]  or [address] [place] [address] es sólo para uso en educación  Rios intención no es darle un consejo médico sobre enfermedades o tratamientos  Colsulte con rios Giana Revering farmacéutico antes de seguir cualquier régimen médico para saber si es seguro y efectivo para usted  Primeros signos del parto   LO QUE USTED DEBE SABER:   Los signos de parto temprano son cambios en rios cuerpo que permiten que rios bebé pase por rios canal del parto  DESPUÉS DE SER DADO DE KEVYN:   Signos y síntomas del parto temprano:   · El aligeramiento  ocurre cuando rios bebé baja por dentro de rios pelvis  Puede ser que usted sienta un aumento de presión en rios pelvis  Puede que esto suceda de unas cuantas semanas a unas horas antes que [employer]  · Las contracciones  son calambres y endurecimiento que ocurren en rios útero para ayudar a  al bebé a través de rios canal de parto  Las contracciones ocurren con regularidad y con Flory Mulling  Cada kolton dura aproximadamente de 30 a 79 segundos y se hacen más lucila hasta que usted de a asif a rios bebé  Las contracciones no se alivian con el movimiento  El dolor empieza en la parte inferior de rios espalda y se Moknine rios abdomen      · El borramiento del útero (adelgazamiento) ocurre cuando rios útero se suaviza y adelgaza para que pueda abrirse para el bebé con facilidad  Rios médico de cabecera u obstetra le examinará el merary del útero para liza si hay borramiento  · La dilatación  es el ensanchamiento del merary del [facility] para la salida del bebé  Rios médico de cabecera u obstetra le examinará el merary del útero para liza si hay dilatación  Rios merary del útero estará completamente abierto y listo para el alumbramiento cuando tenga 10 centímetros de dilatación  · El aumento de muestras  de la vagina puede suceder  Puede que las muestras sophia rosadas, claras o un poquito sangrientas  Estas muestras pueden también llamarse muestras de [place]  Las muestras de beni consisten de un tapón [employer] se forma y Tharon Terrance rios merary del útero won el [facility]  · La ruptura de membranas  es kolton liberación repentina de líquido ruby de la vagina  También se le conoce camilo ruptura leland  Puede que rios médico de cabecera u obstetra necesite romperle la apolonia si no se rompe por sí yoko  Parto falso:  Puede ser que usted tenga signos de [facility], los cuales [place] se conocen camilo contracciones de [place]  El [address] es común y puede que suceda varias semanas o días antes de rios parto actual  Las contracciones no son regulares, y no suceden a corto tiempo la kolton de la [place]  El dolor es generalmente leve, no empeora y se siente solo en el frente  Puede que las contracciones [place] sucedan al final del día y paren después que usted cambie de posición, camine o descanse  Comuníquese con rios médico de cabecera u obstetra si:   · Usted tiene [employer] parte inferior de la espalda o abdomen  · Usted nota rios tapón mucoso o tiene muestras  · Usted tiene preguntas o inquietudes acerca de rios condición o cuidado    Busque ayuda inmediatamente o llame al 911 si:   · Usted tiene contracciones regulares y dolorosas con menos de 5 minutos de diferencia la Hilda Kacie de la otra y que miller de 27 a 79 segundos cada kolton  · Usted tiene mucho sangrado vaginal      · Usted tiene un goteo louis o un desborde repentino de líquido ruby [employer] de la vagina  · Usted nota kolton disminución repentina en los movimientos de dumont bebé  © 2014 [address] is for End User's use only and may not be sold, redistributed or otherwise used for commercial purposes  All illustrations and images included in CareNotes® are the copyrighted property of A D A M [employer] Inc  or Westley Cloud  Esta información es sólo para uso en educación  [place] intención no es darle un consejo médico sobre enfermedades o tratamientos  Colsulte con dumont Fabi Shouts farmacéutico antes de seguir cualquier régimen médico para saber si es seguro y efectivo para usted

## 2022-01-20 ENCOUNTER — HOSPITAL ENCOUNTER (OUTPATIENT)
Dept: LABOR AND DELIVERY | Facility: HOSPITAL | Age: 27
Discharge: HOME | DRG: 560 | End: 2022-01-20
Payer: COMMERCIAL

## 2022-01-20 ENCOUNTER — TELEPHONE (OUTPATIENT)
Dept: LABOR AND DELIVERY | Facility: HOSPITAL | Age: 27
End: 2022-01-20

## 2022-01-20 ENCOUNTER — ULTRASOUND (OUTPATIENT)
Dept: PERINATAL CARE | Facility: OTHER | Age: 27
End: 2022-01-20
Payer: COMMERCIAL

## 2022-01-20 ENCOUNTER — ROUTINE PRENATAL (OUTPATIENT)
Dept: PERINATAL CARE | Facility: OTHER | Age: 27
End: 2022-01-20

## 2022-01-20 ENCOUNTER — HOSPITAL ENCOUNTER (INPATIENT)
Facility: HOSPITAL | Age: 27
LOS: 2 days | Discharge: HOME | DRG: 560 | End: 2022-01-22
Attending: OBSTETRICS & GYNECOLOGY | Admitting: STUDENT IN AN ORGANIZED HEALTH CARE EDUCATION/TRAINING PROGRAM
Payer: COMMERCIAL

## 2022-01-20 VITALS
BODY MASS INDEX: 29.55 KG/M2 | WEIGHT: 160.6 LBS | HEART RATE: 93 BPM | DIASTOLIC BLOOD PRESSURE: 75 MMHG | SYSTOLIC BLOOD PRESSURE: 106 MMHG | HEIGHT: 62 IN

## 2022-01-20 DIAGNOSIS — O40.3XX2 POLYHYDRAMNIOS IN THIRD TRIMESTER COMPLICATION, FETUS 2 OF MULTIPLE GESTATION: ICD-10-CM

## 2022-01-20 DIAGNOSIS — O30.043 DICHORIONIC DIAMNIOTIC TWIN PREGNANCY IN THIRD TRIMESTER: Primary | ICD-10-CM

## 2022-01-20 DIAGNOSIS — Z3A.37 37 WEEKS GESTATION OF PREGNANCY: ICD-10-CM

## 2022-01-20 DIAGNOSIS — Z3A.36 36 WEEKS GESTATION OF PREGNANCY: Primary | ICD-10-CM

## 2022-01-20 DIAGNOSIS — O40.3XX1 POLYHYDRAMNIOS IN THIRD TRIMESTER COMPLICATION, FETUS 1 OF MULTIPLE GESTATION: ICD-10-CM

## 2022-01-20 PROBLEM — O40.3XX0 POLYHYDRAMNIOS IN THIRD TRIMESTER: Status: ACTIVE | Noted: 2022-01-20

## 2022-01-20 LAB
ABO GROUP BLD: NORMAL
BLD GP AB SCN SERPL QL: NEGATIVE
ERYTHROCYTE [DISTWIDTH] IN BLOOD BY AUTOMATED COUNT: 14.2 % (ref 11.6–15.1)
HCT VFR BLD AUTO: 35.8 % (ref 34.8–46.1)
HGB BLD-MCNC: 11.5 G/DL (ref 11.5–15.4)
MCH RBC QN AUTO: 27.5 PG (ref 26.8–34.3)
MCHC RBC AUTO-ENTMCNC: 32.1 G/DL (ref 31.4–37.4)
MCV RBC AUTO: 86 FL (ref 82–98)
PLATELET # BLD AUTO: 223 THOUSANDS/UL (ref 149–390)
PMV BLD AUTO: 10.4 FL (ref 8.9–12.7)
RBC # BLD AUTO: 4.18 MILLION/UL (ref 3.81–5.12)
RH BLD: POSITIVE
SPECIMEN EXPIRATION DATE: NORMAL
WBC # BLD AUTO: 8.74 THOUSAND/UL (ref 4.31–10.16)

## 2022-01-20 PROCEDURE — 76816 OB US FOLLOW-UP PER FETUS: CPT | Performed by: OBSTETRICS & GYNECOLOGY

## 2022-01-20 PROCEDURE — 99213 OFFICE O/P EST LOW 20 MIN: CPT | Performed by: OBSTETRICS & GYNECOLOGY

## 2022-01-20 PROCEDURE — 4A1HXCZ MONITORING OF PRODUCTS OF CONCEPTION, CARDIAC RATE, EXTERNAL APPROACH: ICD-10-PCS | Performed by: OBSTETRICS & GYNECOLOGY

## 2022-01-20 PROCEDURE — 76818 FETAL BIOPHYS PROFILE W/NST: CPT | Performed by: OBSTETRICS & GYNECOLOGY

## 2022-01-20 PROCEDURE — NC001 PR NO CHARGE: Performed by: OBSTETRICS & GYNECOLOGY

## 2022-01-20 PROCEDURE — 86901 BLOOD TYPING SEROLOGIC RH(D): CPT | Performed by: OBSTETRICS & GYNECOLOGY

## 2022-01-20 PROCEDURE — 86900 BLOOD TYPING SEROLOGIC ABO: CPT | Performed by: OBSTETRICS & GYNECOLOGY

## 2022-01-20 PROCEDURE — 86850 RBC ANTIBODY SCREEN: CPT | Performed by: OBSTETRICS & GYNECOLOGY

## 2022-01-20 PROCEDURE — 85027 COMPLETE CBC AUTOMATED: CPT | Performed by: OBSTETRICS & GYNECOLOGY

## 2022-01-20 PROCEDURE — NC001 PR NO CHARGE: Performed by: STUDENT IN AN ORGANIZED HEALTH CARE EDUCATION/TRAINING PROGRAM

## 2022-01-20 PROCEDURE — 86592 SYPHILIS TEST NON-TREP QUAL: CPT | Performed by: OBSTETRICS & GYNECOLOGY

## 2022-01-20 RX ORDER — SODIUM CHLORIDE, SODIUM LACTATE, POTASSIUM CHLORIDE, CALCIUM CHLORIDE 600; 310; 30; 20 MG/100ML; MG/100ML; MG/100ML; MG/100ML
125 INJECTION, SOLUTION INTRAVENOUS CONTINUOUS
Status: DISCONTINUED | OUTPATIENT
Start: 2022-01-20 | End: 2022-01-21

## 2022-01-20 RX ADMIN — SODIUM CHLORIDE, SODIUM LACTATE, POTASSIUM CHLORIDE, AND CALCIUM CHLORIDE 125 ML/HR: .6; .31; .03; .02 INJECTION, SOLUTION INTRAVENOUS at 21:00

## 2022-01-20 NOTE — PROGRESS NOTES
Nonstress testing was performed for the indications of a dichorionic twin pregnancy and polyhydramnios

## 2022-01-20 NOTE — TELEPHONE ENCOUNTER
PT and spouse answered my call  I explained Dr Sada Kendrick recommendation for delivery for Mary Turcios twins at >37 weeks especially in the setting of polyhydramnios  Babies are Vtx/vtx today  I explained risks of induction including risk of bleeding and/or need for  section  I explained cervical ripening (I assume this will be needed) and oxytocin for induction of labor  I explained continuous monitoring, delivery, possible need for  for second twin if malposition or FHR changes or other indications develop  She understands  I explained arrival to the hospital via ER entrance and to ask to go up to L&D and not register as a patient in the ER  I explained visitor policy, that  may eat hospital caf food while he is a permanent visitor  I explained what to pack and bring to L&D and to expect approx 3 day's stay  Pt understands  She was planning to be seen tomorrow for routine prenatal visit with Pierre Berry  Rather than come to this appt for explanation of induction/recommendations, I provided the info via phone and explained she can just go to L&D in the evening after dinner instead  Office staff will also call her to explain her appt is cancelled and she should just go to the hospital at 8 pm     Change of plan due to schedule availability: pt to come in to L&D tonight 22 at 8pm  Pt's  aware, confirms they will come in

## 2022-01-20 NOTE — PATIENT INSTRUCTIONS
Examen de embarazo sin estrés   LO QUE NECESITA SABER:   ¿Qué necesito saber sobre un examen sin estrés? Un examen sin estrés mide el ritmo cardíaco y los movimientos de rios bebé  Sin estrés quiere decir que won el examen no se pondrá ningún estrés o tensión al bebé  ¿Cómo me preparo para el examen sin estrés? Rios médico hablará con usted sobre cómo prepararse para derrick examen  Le puede indicar que consuma alimentos y abundantes líquidos antes de rios examen  Si usted fuma, le puede solicitar que no fume por 2 horas antes del examen  También le dirá qué medicamentos puede amairani o no el día del examen  ¿Qué sucederá won el examen sin estrés? Le podrían indicar que para el examen se acueste boca arriba en la amrik  Alrededor de rios abdomen le colocarán 1 o 2 cinturones con sensores  El ritmo cardíaco de rios bebé será registrado en kolton [place]  En gissel que no haya movimiento de rios bebé, podría ser que esté dormido  Rios médico podría realizar unos sonidos cerca de rios abdomen para tratar de despertar al bebé  El examen por lo general se demora 20 minutos, roberto podría durar más si es necesario despertar a rios bebé  ¿Qué necesito saber [place] del examen? Se espera que rios bebé se mueva por lo menos 2 veces won cierto tiempo  Se espera que el ritmo cardíaco de rios bebé suba por cierto número de latidos por minuto won el movimiento  Si rios bebé no se mueve camilo es lo esperado, es posible que sea necesario repetir el examen o usted puede necesitar que le ordenen otros exámenes  ACUERDOS SOBRE RIOS CUIDADO:   Usted tiene el derecho de ayudar a planear rios cuidado  Aprenda todo lo que pueda sobre rios condición y camilo darle tratamiento  Discuta amanda opciones de tratamiento con amanda médicos para decidir el cuidado que usted desea recibir  Usted siempre tiene el derecho de rechazar el tratamiento  Esta información es sólo para uso en educación   Rios intención no es darle un consejo [facility] o tratamientos  Colsulte con rios Christal Bjornstad farmacéutico antes de seguir cualquier régimen médico para saber si es seguro y efectivo para usted  © Copyright [facility] 2021 Information is for End User's use only and may not be sold, redistributed or otherwise used for commercial purposes   All illustrations and images included in CareNotes® are the copyrighted property of A D A M , Inc  or [address] [place] [address]

## 2022-01-20 NOTE — LETTER
NST sleeve cover sheet    Patient name: Thyra Boxer  : 1995  MRN: [id number]    DARIA: Estimated Date of Delivery: 2/10/22    Obstetrician: _____________SW__________    Reason(s) for testing:  ________________Di/Di Twins______________      Testing frequency:    ___ 2x/wk  _x__ 1x/wk  ___ Dopplers  ___ BPP?       Last growth scan: __________________________________________

## 2022-01-20 NOTE — LETTER
January 20, 2022     [address] [address]    Patient: Ricky Lopez   YOB: 1995   Date of Visit: 1/20/2022       Dear Dr Lyndsey Robbins:    Thank you for referring Ricky Lopez to me for evaluation  Below are my notes for this consultation  If you have questions, please do not hesitate to call me  I look forward to following your patient along with you  Sincerely,        Joe Nelson MD        CC: No Recipients  Joe Nelson MD  1/20/2022  6:59 AM  Sign when Signing Visit  Please refer to the [facility] ultrasound report in Ob Procedures for additional information regarding today's visit

## 2022-01-21 ENCOUNTER — ANESTHESIA EVENT (INPATIENT)
Dept: ANESTHESIOLOGY | Facility: HOSPITAL | Age: 27
DRG: 560 | End: 2022-01-21
Payer: COMMERCIAL

## 2022-01-21 ENCOUNTER — ANESTHESIA (INPATIENT)
Dept: ANESTHESIOLOGY | Facility: HOSPITAL | Age: 27
DRG: 560 | End: 2022-01-21
Payer: COMMERCIAL

## 2022-01-21 PROBLEM — Z3A.37 37 WEEKS GESTATION OF PREGNANCY: Status: ACTIVE | Noted: 2021-08-03

## 2022-01-21 LAB
BASE EXCESS BLDCOA CALC-SCNC: -0.8 MMOL/L (ref 3–11)
BASE EXCESS BLDCOV CALC-SCNC: -3.1 MMOL/L (ref 1–9)
BASE EXCESS BLDCOV CALC-SCNC: 0.1 MMOL/L (ref 1–9)
HCO3 BLDCOA-SCNC: 23.1 MMOL/L (ref 17.3–27.3)
HCO3 BLDCOV-SCNC: 23 MMOL/L (ref 12.2–28.6)
HCO3 BLDCOV-SCNC: 25.4 MMOL/L (ref 12.2–28.6)
O2 CT VFR BLDCOA CALC: 15.1 ML/DL
OXYHGB MFR BLDCOA: 91.2 %
OXYHGB MFR BLDCOV: 50.7 %
OXYHGB MFR BLDCOV: 70 %
PCO2 BLDCOA: 35.2 MM[HG] (ref 30–60)
PCO2 BLDCOV: 44.2 MM HG (ref 27–43)
PCO2 BLDCOV: 45.6 MM HG (ref 27–43)
PH BLDCOA: 7.43 [PH] (ref 7.23–7.43)
PH BLDCOV: 7.32 [PH] (ref 7.19–7.49)
PH BLDCOV: 7.38 [PH] (ref 7.19–7.49)
PO2 BLDCOA: 50.2 MM HG (ref 5–25)
PO2 BLDCOV: 23.1 MM HG (ref 15–45)
PO2 BLDCOV: 28.2 MM HG (ref 15–45)
RPR SER QL: NORMAL
SAO2 % BLDCOV: 11.4 ML/DL
SAO2 % BLDCOV: 9.1 ML/DL

## 2022-01-21 PROCEDURE — 88307 TISSUE EXAM BY PATHOLOGIST: CPT | Performed by: PATHOLOGY

## 2022-01-21 PROCEDURE — 10907ZC DRAINAGE OF AMNIOTIC FLUID, THERAPEUTIC FROM PRODUCTS OF CONCEPTION, VIA NATURAL OR ARTIFICIAL OPENING: ICD-10-PCS | Performed by: OBSTETRICS & GYNECOLOGY

## 2022-01-21 PROCEDURE — 59409 OBSTETRICAL CARE: CPT | Performed by: OBSTETRICS & GYNECOLOGY

## 2022-01-21 PROCEDURE — 82805 BLOOD GASES W/O2 SATURATION: CPT | Performed by: OBSTETRICS & GYNECOLOGY

## 2022-01-21 RX ORDER — METHYLERGONOVINE MALEATE 0.2 MG/ML
INJECTION INTRAVENOUS
Status: COMPLETED
Start: 2022-01-21 | End: 2022-01-21

## 2022-01-21 RX ORDER — CARBOPROST TROMETHAMINE 250 UG/ML
INJECTION, SOLUTION INTRAMUSCULAR
Status: DISPENSED
Start: 2022-01-21 | End: 2022-01-22

## 2022-01-21 RX ORDER — ROPIVACAINE HYDROCHLORIDE 5 MG/ML
INJECTION, SOLUTION EPIDURAL; INFILTRATION; PERINEURAL AS NEEDED
Status: DISCONTINUED | OUTPATIENT
Start: 2022-01-21 | End: 2022-01-21 | Stop reason: HOSPADM

## 2022-01-21 RX ORDER — ONDANSETRON 2 MG/ML
4 INJECTION INTRAMUSCULAR; INTRAVENOUS EVERY 8 HOURS PRN
Status: DISCONTINUED | OUTPATIENT
Start: 2022-01-21 | End: 2022-01-22 | Stop reason: HOSPADM

## 2022-01-21 RX ORDER — LIDOCAINE HYDROCHLORIDE AND EPINEPHRINE 20; 5 MG/ML; UG/ML
INJECTION, SOLUTION EPIDURAL; INFILTRATION; INTRACAUDAL; PERINEURAL AS NEEDED
Status: DISCONTINUED | OUTPATIENT
Start: 2022-01-21 | End: 2022-01-21 | Stop reason: HOSPADM

## 2022-01-21 RX ORDER — METHYLERGONOVINE MALEATE 0.2 MG/ML
0.2 INJECTION INTRAVENOUS ONCE
Status: COMPLETED | OUTPATIENT
Start: 2022-01-21 | End: 2022-01-21

## 2022-01-21 RX ORDER — ROPIVACAINE HYDROCHLORIDE 2 MG/ML
INJECTION, SOLUTION EPIDURAL; INFILTRATION; PERINEURAL
Status: DISPENSED
Start: 2022-01-21 | End: 2022-01-22

## 2022-01-21 RX ORDER — CALCIUM CARBONATE 200(500)MG
1000 TABLET,CHEWABLE ORAL DAILY PRN
Status: DISCONTINUED | OUTPATIENT
Start: 2022-01-21 | End: 2022-01-22 | Stop reason: HOSPADM

## 2022-01-21 RX ORDER — OXYTOCIN/RINGER'S LACTATE 30/500 ML
1-30 PLASTIC BAG, INJECTION (ML) INTRAVENOUS
Status: DISCONTINUED | OUTPATIENT
Start: 2022-01-21 | End: 2022-01-21

## 2022-01-21 RX ORDER — DOCUSATE SODIUM 100 MG/1
100 CAPSULE, LIQUID FILLED ORAL 2 TIMES DAILY
Status: DISCONTINUED | OUTPATIENT
Start: 2022-01-21 | End: 2022-01-22 | Stop reason: HOSPADM

## 2022-01-21 RX ORDER — OXYTOCIN/RINGER'S LACTATE 30/500 ML
62.5 PLASTIC BAG, INJECTION (ML) INTRAVENOUS CONTINUOUS
Status: ACTIVE | OUTPATIENT
Start: 2022-01-21 | End: 2022-01-22

## 2022-01-21 RX ORDER — DIPHENHYDRAMINE HYDROCHLORIDE 50 MG/ML
25 INJECTION INTRAMUSCULAR; INTRAVENOUS EVERY 6 HOURS PRN
Status: DISCONTINUED | OUTPATIENT
Start: 2022-01-21 | End: 2022-01-22 | Stop reason: HOSPADM

## 2022-01-21 RX ORDER — IBUPROFEN 600 MG/1
600 TABLET ORAL EVERY 6 HOURS PRN
Status: DISCONTINUED | OUTPATIENT
Start: 2022-01-21 | End: 2022-01-22 | Stop reason: HOSPADM

## 2022-01-21 RX ORDER — DIAPER,BRIEF,INFANT-TODD,DISP
1 EACH MISCELLANEOUS DAILY PRN
Status: DISCONTINUED | OUTPATIENT
Start: 2022-01-21 | End: 2022-01-22 | Stop reason: HOSPADM

## 2022-01-21 RX ORDER — ROPIVACAINE HYDROCHLORIDE 2 MG/ML
INJECTION, SOLUTION EPIDURAL; INFILTRATION; PERINEURAL CONTINUOUS PRN
Status: DISCONTINUED | OUTPATIENT
Start: 2022-01-21 | End: 2022-01-21 | Stop reason: HOSPADM

## 2022-01-21 RX ORDER — SENNOSIDES 8.6 MG
1 TABLET ORAL DAILY
Status: DISCONTINUED | OUTPATIENT
Start: 2022-01-22 | End: 2022-01-22 | Stop reason: HOSPADM

## 2022-01-21 RX ORDER — MISOPROSTOL 200 UG/1
1000 TABLET ORAL ONCE
Status: COMPLETED | OUTPATIENT
Start: 2022-01-21 | End: 2022-01-21

## 2022-01-21 RX ORDER — TERBUTALINE SULFATE 1 MG/ML
INJECTION, SOLUTION SUBCUTANEOUS
Status: COMPLETED
Start: 2022-01-21 | End: 2022-01-21

## 2022-01-21 RX ORDER — MEDROXYPROGESTERONE ACETATE 150 MG/ML
150 INJECTION, SUSPENSION INTRAMUSCULAR ONCE
Status: COMPLETED | OUTPATIENT
Start: 2022-01-22 | End: 2022-01-22

## 2022-01-21 RX ORDER — SIMETHICONE 80 MG
80 TABLET,CHEWABLE ORAL 4 TIMES DAILY PRN
Status: DISCONTINUED | OUTPATIENT
Start: 2022-01-21 | End: 2022-01-22 | Stop reason: HOSPADM

## 2022-01-21 RX ADMIN — SODIUM CHLORIDE, SODIUM LACTATE, POTASSIUM CHLORIDE, AND CALCIUM CHLORIDE 125 ML/HR: .6; .31; .03; .02 INJECTION, SOLUTION INTRAVENOUS at 04:24

## 2022-01-21 RX ADMIN — ROPIVACAINE HYDROCHLORIDE 10 ML/HR: 2 INJECTION, SOLUTION EPIDURAL; INFILTRATION at 14:50

## 2022-01-21 RX ADMIN — Medication 62.5 MILLI-UNITS/MIN: at 17:48

## 2022-01-21 RX ADMIN — Medication 2 MILLI-UNITS/MIN: at 04:24

## 2022-01-21 RX ADMIN — SODIUM CHLORIDE, SODIUM LACTATE, POTASSIUM CHLORIDE, AND CALCIUM CHLORIDE 125 ML/HR: .6; .31; .03; .02 INJECTION, SOLUTION INTRAVENOUS at 14:55

## 2022-01-21 RX ADMIN — LIDOCAINE HYDROCHLORIDE AND EPINEPHRINE 5 ML: 20; 5 INJECTION, SOLUTION EPIDURAL; INFILTRATION; INTRACAUDAL; PERINEURAL at 14:46

## 2022-01-21 RX ADMIN — ROPIVACAINE HYDROCHLORIDE 5 ML: 5 INJECTION, SOLUTION EPIDURAL; INFILTRATION; PERINEURAL at 14:47

## 2022-01-21 RX ADMIN — WITCH HAZEL 1 PAD: 500 SOLUTION RECTAL; TOPICAL at 23:41

## 2022-01-21 RX ADMIN — BENZOCAINE AND LEVOMENTHOL 1 APPLICATION: 200; 5 SPRAY TOPICAL at 23:41

## 2022-01-21 RX ADMIN — ROPIVACAINE HYDROCHLORIDE 5 ML: 5 INJECTION, SOLUTION EPIDURAL; INFILTRATION; PERINEURAL at 14:52

## 2022-01-21 RX ADMIN — SODIUM CHLORIDE, SODIUM LACTATE, POTASSIUM CHLORIDE, AND CALCIUM CHLORIDE 125 ML/HR: .6; .31; .03; .02 INJECTION, SOLUTION INTRAVENOUS at 12:08

## 2022-01-21 RX ADMIN — METHYLERGONOVINE MALEATE 0.2 MG: 0.2 INJECTION INTRAVENOUS at 17:13

## 2022-01-21 RX ADMIN — Medication 1000 MG: at 17:15

## 2022-01-21 RX ADMIN — ROPIVACAINE HYDROCHLORIDE: 2 INJECTION, SOLUTION EPIDURAL; INFILTRATION at 14:55

## 2022-01-21 RX ADMIN — METHYLERGONOVINE MALEATE 0.2 MG: 0.2 INJECTION, SOLUTION INTRAMUSCULAR; INTRAVENOUS at 17:13

## 2022-01-21 RX ADMIN — MISOPROSTOL 800 MCG: 200 TABLET ORAL at 17:13

## 2022-01-21 NOTE — OB LABOR/OXYTOCIN SAFETY PROGRESS
Oxytocin Safety Progress Check Note - Pramod Turner 32 y o  female MRN: [id number]    Unit/Bed#: L&D 325-01 Encounter: [id number]    Dose (tatianna-units/min) Oxytocin: 10 tatianna-units/min  Contraction Frequency (minutes): (P) 2-3  Contraction Quality: (P) Moderate  Tachysystole: (P) No   Cervical Dilation: Lip/rim (Comment)        Cervical Effacement: 100  Fetal Station: 1  Baseline Rate: (P) 145 bpm  Fetal Heart Rate: 131 BPM  FHR Category: Category I               Vital Signs:   Vitals:    01/21/22 1518   BP: 107/65   Pulse: 75   Resp:    Temp: 98 °F (36 7 °C)           Notes/comments:    Pt feeling pressure, will move pt to OR      Lv Mays MD 1/21/2022 3:59 PM

## 2022-01-21 NOTE — H&P
[address] Cos[address] Palumbo 32 y o  female MRN: [id number]  Unit/Bed#: L&D 325-01 Encounter: [id number]      Assessment: 32 y o   at 37w0d admitted for IOL in the setting of di-di twins  SVE: /-3  FHT: Twin A 130s reactive, Twin B 140s reactive  Vertex confirmed by US for both twins  GBS status: negative   Postpartum contraception plan: depo bridge to tubal    Plan:   · Admit  · CBC, RPR, Type & Screen  · Analgesia at maternal request  · Expectant management and start pitocin in the morning    Dr Anaya Courts aware      SUBJECTIVE:    Chief Complaint: here for my induction    HPI: Thyra Boxer is a 32 y o  [id number] with an DARIA of 2/10/2022, by Ultrasound at 37w0d who is being admitted for an IOL for [employer] twins  She denies having uterine contractions, has no LOF, and reports no VB  She states she has felt good FM  Alice Saha     Pregnancy complications: anemia, di-di twin gestation, polyhdramnios    Baby complications/comments: di-di twins    Patient Active Problem List   Diagnosis    36 weeks gestation of pregnancy    Anemia in pregnancy    Polyhydramnios    Dichorionic diamniotic twin pregnancy in third trimester    Request for sterilization    Polyhydramnios in third trimester       OB History    Para Term  AB Living   3 2 2 0 0 2   SAB IAB Ectopic Multiple Live Births   0 0 0 0 2      # Outcome Date GA Lbr Kushal/2nd Weight Sex Delivery Anes PTL Lv   3 Current            2 Term 12/25/15 38w0d  3345 g (7 lb 6 oz) M Vag-Spont  N ALINE   1 Term 14 40w0d  3345 g (7 lb 6 oz) M Vag-Spont  N ALINE       Past Medical History:   Diagnosis Date    Lupus ([facility] [address] )     cutaneous disease       Past Surgical History:   Procedure Laterality Date    CHOLECYSTECTOMY         Social History     Tobacco Use    Smoking status: Never Smoker    Smokeless tobacco: Never Used   Substance Use Topics    Alcohol use: Not Currently       No Known Allergies    Medications Prior to Admission   Medication    aspirin (ECOTRIN LOW STRENGTH) 81 mg EC tablet    calcium carbonate (OS-MARGO) 600 MG tablet    ferrous sulfate 324 (65 Fe) mg    Prenatal Vit-Fe Fumarate-FA (PRENATAL VITAMINS PO)           OBJECTIVE:  Vitals:  HR:  [93] 93  BP: (106)/(75) 106/75  There is no height or weight on file to calculate BMI  Physical Exam:  Physical Exam  Constitutional:       Appearance: She is well-developed  HENT:      Head: Normocephalic and atraumatic  Eyes:      Extraocular Movements: Extraocular movements intact  Conjunctiva/sclera: Conjunctivae normal    Cardiovascular:      Rate and Rhythm: Normal rate and regular rhythm  Heart sounds: Normal heart sounds  Pulmonary:      Effort: Pulmonary effort is normal       Breath sounds: Normal breath sounds  Abdominal:      Comments: Gravid   Neurological:      Mental Status: She is alert and oriented to person, place, and time  Skin:     General: Skin is warm     Psychiatric:         Mood and Affect: Mood normal          Behavior: Behavior normal             Lab Results   Component Value Date    WBC 9 56 11/22/2021    HGB 10 1 (L) 11/22/2021    HCT 32 5 (L) 11/22/2021     11/22/2021     No results found for: NA, K, CL, CO2, BUN, CREATININE, GLUCOSE, AST, ALT    Prenatal Labs   Blood type: A+  Antibody: negative  Group B strep: negative  HIV: negative  Hepatitis B: negative  RPR: non-reactive  Rubella: Immune  Varicella: Unknown  1 hour Glucose: 124    >2 Midnights  INPATIENT       Collette Simons MD  PGY-2 OB/GYN   1/20/2022 8:31 PM

## 2022-01-21 NOTE — OB LABOR/OXYTOCIN SAFETY PROGRESS
Oxytocin Safety Progress Check Note - Carter Baumgarten 32 y o  female MRN: [id number]    Unit/Bed#: L&D 325-01 Encounter: [id number]    Dose (tatianna-units/min) Oxytocin: 10 tatianna-units/min  Contraction Frequency (minutes): 2-3 5  Contraction Quality: Moderate  Tachysystole: No   Cervical Dilation: 5        Cervical Effacement: 70  Fetal Station: -2  Baseline Rate: 135 bpm      FHR Category: Category I               Vital Signs:   Vitals:    01/21/22 0902   BP: 110/69   Pulse: 88   Resp: 16   Temp: 98 3 °F (36 8 °C)           Notes/comments:   Patient declines epidural for now, as she is comfortable  Continue to titrate oxytocin for now    Will AROM once OR available and patient with epidural     Cristi Santiago MD 1/21/2022 9:53 AM

## 2022-01-21 NOTE — L&D DELIVERY NOTE
Predelivery Diagnosis:  Twin pregnancy at 44 2/10 weeks  Dichorionic-diamniotic twin gestation, vertex-presentation    Post-delivery Diagnosis:  Cephalic-Noncephalic presentation    Procedure:  Spontaneous Vaginal Delivery for Twin A  Internal Podalic Version and Breech Extraction for Twin B (performed by Dr Kiley Cool)    Findings:  Infant Gender: F   Apgars: pending  Presentation: Vertex  Position: LOLY  Restitution: LOT  Nuchal cord: none  No shoulder dystocia  No meconium staining    Delivering Physician:  Nash JEFFERY  Twin A  Dr Hernan Person MD Twin B    Type of Anesthesia: Epidural    QBL: see QBL    Specimen:   Arterial cord blood gas for Twins A and B  Venous cord blood gas for Twins A and B  Cord blood for Twins A and B  Placenta for pathology    Findings:     Twin A  Infant Gender: F   Apgars: pending  Presentation: Vertex  Position: LOLY  Restitution: LOT  Nuchal cord: none  No shoulder dystocia  No meconium staining    Twin B  Infant Gender: F   Apgars: pending  Presentation: Kara Goldmann breech  Nuchal cord: none  No shoulder dystocia  No meconium staining      Placenta: intact, appeared normal  Laceration: none    Gases:  Umbilical Cord Venous Blood Gas:    Twin A    Results from last 7 days   Lab Units 01/21/22  1630   PH COV  7 378   PCO2 COV mm HG 44 2*   HCO3 COV mmol/L 25 4   BASE EXC COV mmol/L 0 1*   O2 CT CD VB mL/dL 11 4   O2 HGB, VENOUS CORD % 17 0     Umbilical Cord Arterial Blood Gas:  Results from last 7 days   Lab Units 01/21/22  1630   PH COA  7 434*   PCO2 COA  35 2   PO2 COA mm HG 50 2*   HCO3 COA mmol/L 23 1   BASE EXC COA mmol/L -0 8*   O2 CONTENT CORD ART ml/dl 15 1   O2 HGB, ARTERIAL CORD % 91 2       Twin B gases pending    Delivery    Patient had an epidural anesthesia in labor  At anterior lip, she was taken to the OR for double setup  She was thought to be vertex-vertex presentation      Twin a was delivered via spontaneous vaginal delivery to a sterile field and intact perineum  Fetal head was delivered and restituted spontaneously  With gentle downward traction the anterior shoulders delivered together with maternal expulsive efforts  With gentle upward traction the posterior shoulders together with maternal expulsive efforts delivered as well as the rest of the body without difficulty  Infant was bulb suctioned at delivery  Delayed clamping of umbilical cord was performed  Infant was placed on mother's abdomen, and later removed to the warmer by the nurse  Cord blood gases, both arterial and venous gases and cord blood was sent for analysis  Intact placenta with a normal configuration 3-vessel cord was delivered spontaneously  Bedside ultrasound was performed to confirm presentation of twin B  Twin B was noted to be yasmine breech presentation  She was counseled regarding different delivery options for noncephalic presentation  Discussed with patient attempt at external cephalic version verses breech extraction versus  delivery  She was open to all options after appropriate counseling  Oxytocin was discontinued and patient was given terbutaline with initial thought to perform an external cephalic version  Fetal heart rate initially showed some late decelerations that recovered with increase in IV fluids as well as patient repositioning  Another obstetrician was called in from home to help with possible breech extraction, should ECV not be successful  Per Dr Isreal Gaines portion of her participation for delivery for Twin B    "By manual palpation baby B in oblique presentation  Was able to rotate to cephalic but no descent of the fetal head was noted  Upon release of the head baby turned to transverse back down  Palpation of the fetus until the level of the feet was performed and internal podalic version was performed and delivery of both feet was performed  SROM occurred at this time   Fetus delivered to the level of the umbilicus and then the body was grasped with a towel  The fetus was delivered to the level of the scapula and then the fetal right arm was delivered using the pinard maneuver  The fetus was rotated 180 degrees and the same ws performed on the left  The fetus was elevated and the fetal head delivered spontaneously"    The cord was doubly clamped and cut  Cord blood gases as well as cord blood was collected  The uterus was aggressively massaged  All clots were cleared from the lower uterine segment  Oxytocin infusion was started to control postpartum bleeding  She had some uterine atony that was controlled with aggressive massage  She was also given additionally Methergine, Cytotec 1000 mcg as well as tranexamic acid  Inspection of the perineum revealed no lacerations and no repairs required  Sponge, instrument and needle count were correct x 2  Uterus was noted to be firm and bleeding was minimal at the end of delivery  Bedside ultrasound was performed and showed thin endometrial suction stripe without any concern for retained products    There were no sponges left in the vagina  There were no complications  Patient tolerated procedure well

## 2022-01-21 NOTE — DISCHARGE INSTRUCTIONS
Parto vaginal   LO QUE NECESITA SABER:   Se produce un alumbramiento vaginal cuando el bebé nace por la vagina (canal del parto)  INSTRUCCIONES SOBRE EL KEVYN HOSPITALARIA:   Llame a rios médico u obstetra si:  · Rios pierna se siente cálida, sensible y adolorida  Se podría liza inflamado y sanchez  · Tiene fiebre  · Usted está orinando muy poco o nada en absoluto  · Usted presenta sangrado vaginal que empapa 1 toalla higiénica o más en 1 hora  · Usted se siente mareado, débil o tiene sensación de [place]  · El dolor abdominal o en el perineo no mejora, o empeora  · Usted se siente deprimido  · Usted tiene preguntas o inquietudes acerca de rios condición o cuidado  Medicamentos:  · [place], camilo el [facility] a disminuir la inflamación, el dolor y la [place]  Florina medicamento está disponible con o sin kolton receta médica  Los WILLAM pueden causar sangrado estomacal o problemas renales en ciertas personas  Si usted mackenzie un medicamento anticoagulante, siempre pregúntele a rios médico si los WILLAM son seguros para usted  Siempre german la etiqueta de florina medicamento y [place] instrucciones  · Los laxantes le facilitan las evacuaciones intestinales  Es posible que deba amairani florina medicamento para tratar o para prevenir el estreñimiento  · [place] amanda medicamentos camilo se le haya indicado  Consulte con rios médico si usted hector que rios medicamento no le está ayudando o si presenta efectos secundarios  Infórmele si es alérgico a cualquier medicamento  Mantenga kolton lista actualizada de los [place], las vitaminas y los productos herbales que mackenzie  Incluya los siguientes datos de los medicamentos: cantidad, frecuencia y motivo de administración  Traiga con usted la lista o los envases de las píldoras a amanda citas de seguimiento  Lleve la lista de los medicamentos con usted en gissel de kolton emergencia  Actividad: Descanse el mayor tiempo posible  No realice [employer] tiempo a la vez   Es posible que pueda hacer algo de ejercicio poco después de que nazca el bebé  Consulte con rios médico antes de comenzar a ejercitarse  Si trabaja fuera de casa, pregunte cuándo puede reintegrarse a rios trabajo  Ejercicios de Kegel: Los ejercicios de Kegel pueden ayudar a que los músculos de rios vagina y recto se recuperen más rápidamente  [employer] ejercicios de Kegel contrayendo y relajando los músculos alrededor de la vagina  Los ejercicios de Kegel ayudan a [employer]  Cuidado de los senos: Es posible que los senos se sientan llenos y duros cuando le baje la [place]  Pregunte cómo cuidar de amanda senos, incluso si decide no amamantar al bebé  Estreñimiento: Es posible que esté estreñida por un tiempo después de que nazca el bebé  No fatoumata fuerza si las heces son Aslly Ask duras  Consuma alimentos con un alto contenido de [place] y Yanelis Flurry mayor cantidad de líquido para evitar el estreñimiento  Ejemplos de alimentos ricos en fibra son las frutas, y salvado  El [place] de ciruelas y [place] agua son muy buenos para amairani  Es probable que [employer] indiquen consumir fibra y amairani medicamentos para ablandar las evacuaciones intestinales de venta sin [place]  [place] estos artículos según indicaciones médicas  Pregunte cómo puede prevenir o tratar las hemorroides  Cuidado del perineo: El perineo es el área que se encuentra entre la vagina y el ano  Paige [place]xt[place] y Farshada Pafos  Pukalani la ayudará a curar y a prevenir la infección  Lave el área suavemente con agua y jabón cuando se bañe o tome kolton [place]  Enjuague el perineo con agua tibia después de orinar o de defecar  Un baño de asiento con [place] puede ayudar a disminuir el dolor  Llene la maty con 4 a 6 pulgadas de agua tibia  [address] usar un recipiente para baño de asiento que quepa en el inodoro  Siéntese en el baño de asiento won 20 minutos  Fatoumata esto 2 a 3 veces al día o camilo se lo hayan indicado   El agua cálida va a ayudar a reducir el dolor y la inflamación  Secreción vaginal: Tendrá secreción vaginal, llamada loquios, después de peter a asif  El sangrado es sanchez o marrón oscuro con coágulos de1 a 3 días después del nacimiento del bebé  La cantidad disminuirá y se tornará ayan pálido o marrón won 3 a 10 días  Se tornará prather o amarillo en el día 10 o 15  Use kolton compresa higiénica, en lugar de tampones, para evitar la infección vaginal  Tendrá loquios hasta 3 semanas después del nacimiento de rios bebé  Períodos menstruales: Es posible que vuelva a tener rios período menstrual dentro de 7 a 9 semanas de que haya nacido el bebé  Podría tardar [employer] en volver a tener rios menstruación si está amamantando a rios bebé  Puede volver a quedar embarazada, incluso si no tiene períodos menstruales  Consulte con rios médico acerca de qué método anticonceptivo usar si no desea volver a Tod Ricardo  Cambios en el estado de ánimo: Muchas nuevas madres experimentan algún cambio de humor después de peter a asif  Algunos de [facility] se deben a la falta de sueño, los cambios hormonales y el cuidado del bebé  Algunos cambios de humor pueden ser serios, camilo la depresión posparto  Hable con rios médico si no se siente capaz de cuidarse a usted misma y a rios bebé  Relaciones sexuales: No tenga relaciones sexuales hasta que rios médico lo autorice  Es posible que no sienta deseo sexual o que sienta dolor con la actividad sexual  Puede usar un lubricante (gel) vaginal para que la actividad sexual sea más placentera  Acuda a amanda consultas de control con rios médico u obstetra según le indicaron: La mayoría de las mujeres regresan a las 6 semanas después de un alumbramiento vaginal  Pregunte cómo debe cuidar de amanda heridas o puntos de sutura  Anote amanda preguntas para que se acuerde de hacerlas won amanda visitas  © Copyright [employer] 2021 Information is for End User's use only and may not be sold, redistributed or otherwise used for commercial purposes   All illustrations and images included in CareNotes® are the copyrighted property of A D A M , Inc  or [address] [place] [address] es sólo para uso en educación  Rios intención no es darle un consejo médico sobre enfermedades o tratamientos  Colsulte con rios Christal Bjornstad farmacéutico antes de seguir cualquier régimen médico para saber si es seguro y efectivo para usted

## 2022-01-21 NOTE — ANESTHESIA PROCEDURE NOTES
Epidural Block    Patient location during procedure: OB  Start time: 1/21/2022 2:46 PM  Reason for block: procedure for pain and at surgeon's request  Staffing  Performed: Anesthesiologist   Anesthesiologist: Kera Rowan DO  Preanesthetic Checklist  Completed: patient identified, IV checked, site marked, risks and benefits discussed, surgical consent, monitors and equipment checked, pre-op evaluation and timeout performed  Epidural  Patient position: sitting  Prep: Betadine  Patient monitoring: frequent blood pressure checks, continuous pulse ox and heart rate  Approach: midline  Location: lumbar  Injection technique: CLIFTON air  Needle  Needle type: Tuohy   Needle gauge: 18 G  Catheter at skin depth: 11 cm  Catheter securement method: clear occlusive dressing  Test dose: negative  Assessment  Number of attempts: 1negative aspiration for CSF, no paresthesia on injection and negative aspiration for heme  patient tolerated the procedure well with no immediate complications

## 2022-01-21 NOTE — DISCHARGE SUMMARY
Discharge Summary - OB/GYN  Tawana Isidro 32 y o  female MRN: [id number]  Unit/Bed#: L&D 325-01 Encounter: [id number]    Admission Date: 2022     Discharge Date: 22    Delivering Attendings: MD Lloyd Marquez MD  Discharging Attending: Nabeel Solis MD    Admitting Diagnosis:   Dichorionic diamniotic twin gestation at 40 weeks 0 days     Discharge Diagnoses:   Same, delivered    Procedures: spontaneous vaginal delivery of Twin A, internal podalic version of twin B, breech extraction of Twin B    Anesthesia: epidural    Hospital course: Tawana Isidro is a 32 y o  [id number] who was initially admitted for induction of labor for dichorionic diamniotic twin gestation  She was 5cm on admission and was induced with Pitocin  An epidural was placed for analgesia  Artifical amniotomy was performed for Twin A and she progressed to complete cervical dilation  On 2022 she Twin A at 37w1d via normal spontaneous vaginal delivery over an intact perineum  The birth weight of Twin A was 6lb 10 2oz and Apgars were 9 and 9 at 1 and 5 minutes respectively  Under ultrasound guidance, internal podalic version of Twin B followed by breech extraction was performed  The birth weight of Twin B was 6 lbs 7 4 lb and  Apgars were 8 (1 min) and 9 (5 min)  Both  were admitted to the  nursery  TXA, Methergin and rectal Cytotec were administered for postpartum hemorrhage prophylaxis  Her post-delivery course was uncomplicated  Her postpartum pain was well controlled with oral analgesics  She received a Depo shot for birth control and she plans on a tubal ligation at 6 weeks postpartum    On day of discharge, she was ambulating and able to reasonably perform all ADLs  She was voiding and had appropriate bowel function  Pain was well controlled  She was discharged home on postpartum day #1 without complications   Patient was instructed to follow up with her OBGYN as an outpatient and was given appropriate warnings to call provider if she develops signs of infection or uncontrolled pain  Complications: none apparent    Condition at discharge: good     Provisions for Follow-Up Care:  Please see after visit summary for information related to follow-up care and any pertinent home health orders  Disposition: Home    Planned Readmission: No    Discharge Medications:   Please see AVS for a complete list of discharge medications  Discharge instructions :   Please see AVS for complete discharge instructions  Guy Worthington 89, DO  Obstetrics & Gynecology PGY-2  1/22/2022  8:11 PM

## 2022-01-21 NOTE — OB LABOR/OXYTOCIN SAFETY PROGRESS
Oxytocin Safety Progress Check Note - Dennis Sic 32 y o  female MRN: [id number]    Unit/Bed#: L&D 325-01 Encounter: [id number]    Dose (tatianna-units/min) Oxytocin: 6 tatianna-units/min  Contraction Frequency (minutes): 4-5  Contraction Quality: Mild  Tachysystole: No   Cervical Dilation: 5        Cervical Effacement: 70  Fetal Station: -1  Baseline Rate: 130 bpm     FHR Category: Category I               Vital Signs:   Vitals:    01/20/22 2120   BP:    Pulse:    Resp:    Temp: 98 °F (36 7 °C)           Notes/comments:    Fetal head has descended further  Cat I tracing for both twins  Will continue to uptitrate pitocin  Will recheck in 2h or earlier if needed   D/w Dr Gloria Andrews MD 1/21/2022 6:15 AM

## 2022-01-21 NOTE — PLAN OF CARE
Problem: Knowledge Deficit  Goal: Verbalizes understanding of labor plan  Description: Assess patient/family/caregiver's baseline knowledge level and ability to understand information  Provide education via patient/family/caregiver's preferred learning method at appropriate level of understanding  1  Provide teaching at level of understanding  2  Provide teaching via preferred learning method(s)   1/21/2022 0835 by Zabrina Laguerre RN  Outcome: Progressing  1/21/2022 0835 by Zabrina Laguerre RN  Outcome: Progressing     Problem: Labor & Delivery  Goal: Manages discomfort  Description: Assess and monitor for signs and symptoms of discomfort  Assess patient's pain level regularly and per hospital policy  Administer medications as ordered  Support use of nonpharmacological methods to help control pain such as distraction, imagery, relaxation, and application of heat and cold  Collaborate with interdisciplinary team and patient to determine appropriate pain management plan  1  Include patient in decisions related to comfort  2  Offer non-pharmacological pain management interventions  3  Report ineffective pain management to physician   1/21/2022 0835 by Zabrina Laguerre RN  Outcome: Progressing  1/21/2022 0835 by Zabrina Laguerre RN  Outcome: Progressing  Goal: Patient vital signs are stable  Description: 1  Assess vital signs - vaginal delivery    1/21/2022 0835 by Zabrina Laguerre RN  Outcome: Progressing  1/21/2022 0835 by Zabrina Laguerre RN  Outcome: Progressing

## 2022-01-21 NOTE — OB LABOR/OXYTOCIN SAFETY PROGRESS
Labor Progress Note - Phu Thompson 32 y o  female MRN: [id number]    Unit/Bed#: L&D 325-01 Encounter: [id number]       Contraction Frequency (minutes): irregular     Tachysystole: No   Cervical Dilation: 5        Cervical Effacement: 50  Fetal Station: -3  Baseline Rate: 120 bpm                     Vital Signs:   Vitals:    01/20/22 2120   BP:    Pulse:    Resp:    Temp: 98 °F (36 7 °C)           Notes/comments: Both twins on the monitor  Reactive tracing  Plan to start pitocin at this time   D/w Dr Antoinette Handley MD 1/21/2022 4:08 AM

## 2022-01-21 NOTE — ANESTHESIA PREPROCEDURE EVALUATION
Procedure:  LABOR ANALGESIA    Relevant Problems   GYN   (+) 36 weeks gestation of pregnancy      HEMATOLOGY   (+) Anemia in pregnancy        Physical Exam    Airway    Mallampati score: I  TM Distance: <3 FB  Neck ROM: full     Dental       Cardiovascular  Rhythm: regular, Rate: normal, Cardiovascular exam normal    Pulmonary  Pulmonary exam normal     Other Findings        Anesthesia Plan  ASA Score- 2     Anesthesia Type- epidural with ASA Monitors  Additional Monitors:   Airway Plan:           Plan Factors-Exercise tolerance (METS): >4 METS  Chart reviewed  Existing labs reviewed  Patient summary reviewed  Induction-     Postoperative Plan-     Informed Consent- Anesthetic plan and risks discussed with patient and spouse

## 2022-01-21 NOTE — QUICK NOTE
Called by Dr Rosanne Mueller while I was at home in consultation  She reports patient is a 32 y o  @ 37+1 weeks with di/di twins who was a scheduled IOL for vtx/vtx twins  Baby A had delivered about 10 minutes before she called me and on ultrasound baby B is malpositioned  She requests that I evaluate patient for possible breech extraction vs ECV and if fails for primary C/S  Asked how far dilated patient is and was notified she was 9 cm  Advised I would come to the hospital immediately    Upon my arrival FHR was noted to be category I  By manual palpation baby B in oblique presentation  Was able to rotate to cephalic but no descent of the fetal head was noted  Upon release of the head baby turned to transverse back down  Palpation of the fetus until the level of the feet was performed and internal podalic version was performed and delivery of both feet was performed  SROM occurred at this time  Fetus delivered to the level of the umbilicus and then the body was grasped with a towel  The fetus was delivered to the level of the scapula and then the fetal right arm was delivered using the pinard maneuver  The fetus was rotated 180 degrees and the same ws performed on the left  The fetus was elevated and the fetal head delivered spontaneously  The cord was double clamped and cut and handed off to the NICU staff  Please see delivery report by Dulce Moffett for the remainder of her delivery      Kamryn Weber MD

## 2022-01-21 NOTE — OB LABOR/OXYTOCIN SAFETY PROGRESS
Oxytocin Safety Progress Check Note - Ayana Wang 32 y o  female MRN: [id number]    Unit/Bed#: L&D 325-01 Encounter: [id number]    Dose (tatianna-units/min) Oxytocin: 10 tatianna-units/min  Contraction Frequency (minutes): 2-3  Contraction Quality: Moderate  Tachysystole: No   Cervical Dilation: 5-6        Cervical Effacement: 80  Fetal Station: -1  Baseline Rate: 135 bpm     FHR Category: Category I               Vital Signs:   Vitals:    01/21/22 1455   BP: 117/73   Pulse: 87   Resp:    Temp:            Notes/comments:     Patient comfortable with epidural   Vertex of Twin 1, well applied  Amniotomy performed for Sac 1  Large amount of amniotic fluid noted, clear  Continue oxytocin        Owen Landeros MD 1/21/2022 3:16 PM

## 2022-01-22 VITALS
WEIGHT: 160.6 LBS | HEIGHT: 62 IN | SYSTOLIC BLOOD PRESSURE: 101 MMHG | OXYGEN SATURATION: 97 % | TEMPERATURE: 97.6 F | DIASTOLIC BLOOD PRESSURE: 59 MMHG | RESPIRATION RATE: 18 BRPM | BODY MASS INDEX: 29.55 KG/M2 | HEART RATE: 67 BPM

## 2022-01-22 PROCEDURE — 99024 POSTOP FOLLOW-UP VISIT: CPT | Performed by: OBSTETRICS & GYNECOLOGY

## 2022-01-22 RX ORDER — IBUPROFEN 200 MG
600 TABLET ORAL EVERY 6 HOURS PRN
Start: 2022-01-22 | End: 2022-02-11

## 2022-01-22 RX ADMIN — MEDROXYPROGESTERONE ACETATE 150 MG: 150 INJECTION, SUSPENSION, EXTENDED RELEASE INTRAMUSCULAR at 09:08

## 2022-01-22 RX ADMIN — DOCUSATE SODIUM 100 MG: 100 CAPSULE ORAL at 17:49

## 2022-01-22 RX ADMIN — DOCUSATE SODIUM 100 MG: 100 CAPSULE ORAL at 09:05

## 2022-01-22 RX ADMIN — SENNOSIDES 8.6 MG: 8.6 TABLET, FILM COATED ORAL at 09:05

## 2022-01-22 NOTE — PROGRESS NOTES
Progress Note - OB/GYN   Eulene Bowels 32 y o  female MRN: [id number]  Unit/Bed#: L&D 309-01 Encounter: [id number]    This visit was conducted in [place]    Assessment:  PPD#1 s/p /IPV + breech extraction of di/di twins  Plan:    1) Postpartum care  - Encourage ambulation  - Encourage breastfeeding  - Continue current meds     2) Uterine atony w/o hemorrhage  -   - S/p prophylactic TXA, methergine, cytotec  - She is U+1 this AM but firm, recommended emptying of bladder    3) Contraception  - Desires depo bridge to tubal    4) Disposition  - Anticipate discharge home today if bilirubin comes back normal for babies, if not will d/c PPD2    Subjective/Objective     Subjective:     Pain: no  Tolerating PO: yes  Voiding: yes  Flatus: yes  BM: yes  Ambulating: yes  Breastfeeding: Breastfeeding  Chest pain: no  Shortness of breath: no  Leg pain: no  Lochia: Minimal    Objective:     Vitals:  Vitals:    22 1930 22 1945 22 2330 22 0444   BP: 148/93 135/63 120/64 115/62   BP Location:   Right arm Right arm   Pulse:   79 61   Resp:   16 20   Temp:   98 4 °F (36 9 °C) 98 2 °F (36 8 °C)   TempSrc:   Oral Oral   SpO2:    97%   Weight:       Height:           Physical Exam:   GEN: appears well, alert and oriented x 3, pleasant and cooperative   CV: Regular rate  RESP: non labored breathing  ABDOMEN: soft, no tenderness, no distention, Uterine fundus firm and non-tender, +1 cm above the umbilicus   EXTREMITIES: non-tender  NEURO Alert and oriented to person, place, and time         Lab Results   Component Value Date    WBC 8 74 2022    HGB 11 5 2022    HCT 35 8 2022    MCV 86 2022     2022         Radha Santamaria DO, PGY-2  Obstetrics & Gynecology  22

## 2022-01-22 NOTE — LACTATION NOTE
This note was copied from a baby's chart  Met with mother  Provided mother Ecuadorean with Ready, Set, Baby booklet  Provided Ecuadorean breast feeding twins hand out  Discussed Skin to Skin contact an benefits to mom and baby  Talked about the delay of the first bath until baby has adjusted  Spoke about the benefits of rooming in  Feeding on cue and what that means for recognizing infant's hunger  Avoidance of pacifiers for the first month discussed  Talked about exclusive breastfeeding for the first 6 months  Positioning and latch reviewed as well as showing images of other feeding positions  Discussed the properties of a good latch in any position  Reviewed hand/manual expression  Discussed s/s that baby is getting enough milk and some s/s that breastfeeding dyad may need further help  Gave information on common concerns, what to expect the first few weeks after delivery, preparing for other caregivers, and how partners can help  Resources for support also provided  Mom has no active insurance  Hand pump provided with instructions on use and cleansing  Encouraged parents to call for assistance, questions, and concerns about breastfeeding  Extension provided

## 2022-02-01 ENCOUNTER — TELEPHONE (OUTPATIENT)
Dept: OBGYN CLINIC | Facility: CLINIC | Age: 27
End: 2022-02-01

## 2022-02-11 ENCOUNTER — POSTPARTUM VISIT (OUTPATIENT)
Dept: OBGYN CLINIC | Facility: CLINIC | Age: 27
End: 2022-02-11

## 2022-02-11 VITALS
WEIGHT: 128 LBS | HEART RATE: 87 BPM | BODY MASS INDEX: 23.55 KG/M2 | DIASTOLIC BLOOD PRESSURE: 68 MMHG | SYSTOLIC BLOOD PRESSURE: 104 MMHG | HEIGHT: 62 IN

## 2022-02-11 DIAGNOSIS — Z30.09 UNWANTED FERTILITY: ICD-10-CM

## 2022-02-11 PROBLEM — Z3A.37 37 WEEKS GESTATION OF PREGNANCY: Status: RESOLVED | Noted: 2021-08-03 | Resolved: 2022-02-11

## 2022-02-11 PROBLEM — O99.019 ANEMIA IN PREGNANCY: Status: RESOLVED | Noted: 2021-08-20 | Resolved: 2022-02-11

## 2022-02-11 PROBLEM — O40.9XX0 POLYHYDRAMNIOS: Status: RESOLVED | Noted: 2021-12-01 | Resolved: 2022-02-11

## 2022-02-11 PROBLEM — O30.043 DICHORIONIC DIAMNIOTIC TWIN PREGNANCY IN THIRD TRIMESTER: Status: RESOLVED | Noted: 2022-01-13 | Resolved: 2022-02-11

## 2022-02-11 PROBLEM — O40.3XX0 POLYHYDRAMNIOS IN THIRD TRIMESTER: Status: RESOLVED | Noted: 2022-01-20 | Resolved: 2022-02-11

## 2022-02-11 PROBLEM — Z30.2 REQUEST FOR STERILIZATION: Status: RESOLVED | Noted: 2022-01-13 | Resolved: 2022-02-11

## 2022-02-11 PROCEDURE — 99213 OFFICE O/P EST LOW 20 MIN: CPT | Performed by: NURSE PRACTITIONER

## 2022-02-11 RX ORDER — MEDROXYPROGESTERONE ACETATE 150 MG/ML
150 INJECTION, SUSPENSION INTRAMUSCULAR
Qty: 1 ML | Refills: 3 | Status: SHIPPED | OUTPATIENT
Start: 2022-02-11

## 2022-02-11 NOTE — PATIENT INSTRUCTIONS
DESPUÉS DEL PARTO      Debe comunicarse con rios proveedor de GINECÓLOGO si usted experimenta cualquiera de los siguientes:  1  sangrado que empapa kolton almohadilla cada hora won 2 horas  2  mal olor procedente de la vagina  3  fiebre de 100 4 o superior  4  incisión o dolor abdominal que no irá lejos a pesar de prescribe medicamentos para el dolor  5  hinchazón, enrojecimiento, secreción o sangrado de la incisión de cesárea o sitio de desgarro perineal   6  la incisión se comienza a separar  7  problemas para orinar incluyendo incapacidad para orinar, ardor al [place] o muy oscura de la [place]  8  no movimiento intestinal dentro de 4 días de peter a asif, o la dificultad con las deposiciones después de eso  9  cualquier tipo de disturbio visual (visión doble, Desenfoque, etcetera)  10  cefalea  11  gripe-camilo síntomas  12  dolor o enrojecimiento en apple o ambos de amanda pechos  13  dolor, calor, sensibilidad o hinchazón en las piernas, especialmente la toño de la pantorrilla  14  frecuentes náuseas y vómitos  15  signos de depresión o ansiedad  16  Si experimenta darrick de pecho o tiene problemas para respirar, llame al 911 inmediatamente  En general puede reanudar el coito sexual después de 6 semanas de la entrega  Es importante continuar cambiando tus toallas sanitarias con frecuencia y limpiar el perineo al menos 2 - 3 veces al día  Mantenga la incisión abdominal después de entrega cesariana limpio y seco en todo momento

## 2022-02-11 NOTE — PROGRESS NOTES
POSTPARTUM VISIT    Kizzy Parada presents today for postpartum visit  She had a vaginal delivery on 1/21/2022 of twins  Complications included none  She is breastfeeding her infant and reports no issues with such  She desires surgical sterilization for contraception and was administered injection of Depoprovera on 1/22/2022 prior to hospital as a contraceptive bridge  She was provided with [employer] Rise Depression Screening tool and her score was 0  Review of Systems:   -Constitutional: denies issues, denies pain   -Breasts: denies tenderness   -Gynecologic: lochia continues - light flow   -Urinary: denies issues urinating   -GI: stools WNL, denies issues    Physical Exam:   -Vitals:   Vitals:    02/11/22 1031   BP: 104/68   Pulse: 87   Weight: 58 1 kg (128 lb)   Height: 5' 2" (1 575 m)      -General: A&Ox3, no acute distress noted   -Abdomen: soft, non-tender   -Extremities: nontender, no edema noted   -Breasts: deferred   -Pelvic exam: deferred    Assessment/Plan:  1  Normal postpartum exam   2  Depression screening negative  3  Last pap smear was done 8/3/2021 and result was NILM  Advise return for next annual GYN exam in 8/2022  4  Contraception: Desires surgical sterilization  Aneita Prima form signed 11/11/2021, but signed another one again today as patient is awaiting initiation of medical insurance and then we can proceed with making surgical plans  Until then, will continue with Depoprovera injection every 3 months  Rx sent to [facility]

## 2022-04-21 ENCOUNTER — OFFICE VISIT (OUTPATIENT)
Dept: OBGYN CLINIC | Facility: CLINIC | Age: 27
End: 2022-04-21

## 2022-04-21 VITALS
HEART RATE: 77 BPM | SYSTOLIC BLOOD PRESSURE: 117 MMHG | BODY MASS INDEX: 24.87 KG/M2 | DIASTOLIC BLOOD PRESSURE: 75 MMHG | WEIGHT: 136 LBS

## 2022-04-21 DIAGNOSIS — Z78.9 BREASTFEEDING (INFANT): ICD-10-CM

## 2022-04-21 DIAGNOSIS — Z30.011 ENCOUNTER FOR INITIAL PRESCRIPTION OF CONTRACEPTIVE PILLS: Primary | ICD-10-CM

## 2022-04-21 DIAGNOSIS — Z30.09 GENERAL COUNSELING AND ADVICE ON FEMALE CONTRACEPTION: ICD-10-CM

## 2022-04-21 PROCEDURE — 99213 OFFICE O/P EST LOW 20 MIN: CPT | Performed by: NURSE PRACTITIONER

## 2022-04-21 RX ORDER — ACETAMINOPHEN AND CODEINE PHOSPHATE 120; 12 MG/5ML; MG/5ML
1 SOLUTION ORAL DAILY
Qty: 28 TABLET | Refills: 3 | Status: SHIPPED | OUTPATIENT
Start: 2022-04-21

## 2022-04-21 NOTE — PROGRESS NOTES
Assessment/Plan:         Diagnoses and all orders for this visit:    Encounter for initial prescription of contraceptive pills  -     norethindrone (Ortho Micronor) 0 35 MG tablet; Take 1 tablet (0 35 mg total) by mouth daily    General counseling and advice on female contraception    Breastfeeding (infant)      Plan  Start Micronor today  Call with needs or concerns  Return in 3 months to follow up birth control pill start  Annual exam due after 8/3/2022  Pt verbalized understanding of all discussed  Subjective:      Patient ID: Dong Mitchell is a 32 y o  female  HPI   Pt states she is currently using Depoprovera for birth control and has been having irregular VB for the last month  Pt states she would like a regular period and would like to switch to OCP's  Pt states she is still breastfeeding   Delivered twins on 1/21/2022  WNL PAP 8/3/2021    Safe and effective use of Micronor was provided    The following portions of the patient's history were reviewed and updated as appropriate: allergies, current medications, past family history, past medical history, past social history, past surgical history and problem list     Review of Systems      Pertinent items are note in the HPI    Objective:      /75   Pulse 77   Wt 61 7 kg (136 lb)   BMI 24 87 kg/m²          Physical Exam  Vitals reviewed  Constitutional:       Appearance: Normal appearance  Eyes:      General:         Right eye: No discharge  Left eye: No discharge  Pulmonary:      Effort: Pulmonary effort is normal  No respiratory distress  Musculoskeletal:         General: Normal range of motion  Cervical back: Normal range of motion  Neurological:      Mental Status: She is alert and oriented to person, place, and time  Psychiatric:         Mood and Affect: Mood normal          Behavior: Behavior normal          Thought Content:  Thought content normal        negative cough or SOB

## 2022-04-21 NOTE — PATIENT INSTRUCTIONS
Start Micronor today  Call with needs or concerns  Return in 3 months to follow up birth control pill start  Annual exam due after 8/3/2022    COVID-19 Instructions    If you are having any of the following:  Cough   Shortness of breath   Fever  If traveled within past 2 weeks internationally or to high risk [place] states  Or been in contact with someone that has     Please call either:   Your PCP office  [phone], option [phone]    They will screen you over the phone and direct you to the nearest appropriate testing location  DO NOT go to your PCP or OB office without calling first     Thank you for your confidence in our team    We appreciate you and welcome your feedback  If you receive a survey from us, please take a few moments to let us know how we are doing     Sincerely,  ANDREAS Alberts

## 2022-06-06 ENCOUNTER — HOSPITAL ENCOUNTER (EMERGENCY)
Facility: HOSPITAL | Age: 27
Discharge: HOME | End: 2022-06-06
Attending: EMERGENCY MEDICINE

## 2022-06-06 VITALS
SYSTOLIC BLOOD PRESSURE: 112 MMHG | OXYGEN SATURATION: 100 % | RESPIRATION RATE: 18 BRPM | DIASTOLIC BLOOD PRESSURE: 75 MMHG | BODY MASS INDEX: 24.03 KG/M2 | TEMPERATURE: 98.5 F | WEIGHT: 131.39 LBS | HEART RATE: 70 BPM

## 2022-06-06 DIAGNOSIS — N93.8 DYSFUNCTIONAL UTERINE BLEEDING: Primary | ICD-10-CM

## 2022-06-06 LAB
BACTERIA UR QL AUTO: ABNORMAL /HPF
BILIRUB UR QL STRIP: NEGATIVE
CLARITY UR: ABNORMAL
COLOR UR: YELLOW
EXT PREG TEST URINE: NORMAL
EXT. CONTROL ED NAV: NORMAL
GLUCOSE UR STRIP-MCNC: NEGATIVE MG/DL
HGB UR QL STRIP.AUTO: ABNORMAL
KETONES UR STRIP-MCNC: NEGATIVE MG/DL
LEUKOCYTE ESTERASE UR QL STRIP: NEGATIVE
MUCOUS THREADS UR QL AUTO: ABNORMAL
NITRITE UR QL STRIP: NEGATIVE
NON-SQ EPI CELLS URNS QL MICRO: ABNORMAL /HPF
PH UR STRIP.AUTO: 5.5 [PH] (ref 4.5–8)
PROT UR STRIP-MCNC: NEGATIVE MG/DL
RBC #/AREA URNS AUTO: ABNORMAL /HPF
SP GR UR STRIP.AUTO: >=1.03 (ref 1–1.03)
UROBILINOGEN UR QL STRIP.AUTO: 0.2 E.U./DL
WBC #/AREA URNS AUTO: ABNORMAL /HPF

## 2022-06-06 PROCEDURE — 96372 THER/PROPH/DIAG INJ SC/IM: CPT

## 2022-06-06 PROCEDURE — 81025 URINE PREGNANCY TEST: CPT | Performed by: EMERGENCY MEDICINE

## 2022-06-06 PROCEDURE — 81001 URINALYSIS AUTO W/SCOPE: CPT

## 2022-06-06 PROCEDURE — 99284 EMERGENCY DEPT VISIT MOD MDM: CPT | Performed by: EMERGENCY MEDICINE

## 2022-06-06 PROCEDURE — 99284 EMERGENCY DEPT VISIT MOD MDM: CPT

## 2022-06-06 RX ORDER — KETOROLAC TROMETHAMINE 30 MG/ML
30 INJECTION, SOLUTION INTRAMUSCULAR; INTRAVENOUS ONCE
Status: COMPLETED | OUTPATIENT
Start: 2022-06-06 | End: 2022-06-06

## 2022-06-06 RX ADMIN — KETOROLAC TROMETHAMINE 30 MG: 30 INJECTION, SOLUTION INTRAMUSCULAR; INTRAVENOUS at 10:18

## 2022-06-06 NOTE — DISCHARGE INSTRUCTIONS
Puede alternar paracetamol 1000 mg e ibuprofeno 600 mg cada 3 horas para el dolor  Trate de cronometrar rios ibuprofeno para que pueda tomarlo cuando coma  Regrese al departamento de emergencias si está saturando más de kolton toallita maxi por hora won más de 6 horas, si se desmaya o siente que se va a desmayar, o si tiene alguna inquietud  Llame a rios obstetra/ginecólogo hoy para programar kolton denise para analizar rios sangrado

## 2022-06-06 NOTE — Clinical Note
Darwin Berry was seen and treated in our emergency department on 6/6/2022  Diagnosis:     Gunjan Rodarte  may return to work on return date  She may return on this date: 06/07/2022         If you have any questions or concerns, please don't hesitate to call        Alonzo Gastelum RN    ______________________________           _______________          _______________  [facility] Representative                              Date                                Time

## 2022-06-06 NOTE — Clinical Note
Dar Scott accompanied Jonathan Cook to the emergency department on 6/6/2022  Return date if applicable: 50/01/9731        If you have any questions or concerns, please don't hesitate to call        Neo Sanchez, DO

## 2022-06-06 NOTE — ED PROVIDER NOTES
History  Chief Complaint   Patient presents with    Abdominal Pain     Pt reports abdominal pain x10 days, denies vomiting  Recently switched birth control  31y F here w/  for evaluation of vaginal bleeding  Pt had twins January 2022 an received depo shot at that time and then switched to [facility] in April  Initially was doing well with the pills but has now been bleeding for the last two -three weeks  Wears pads and only occas has to change because they are saturated  No sig clots  Has lower abd cramping like a period  For the last 10 days reports feeling a little bloated, having cramping and the bleeding as noted above  Taking pills as prescribed at the same time every day and hasn't missed any doses  Has an appt in August w/ OB but was concerned about the bleeding and wanted to know what she should do about it    Denies f/c/s, normal appetite, no n/v/d, no dysuria  No lh/near syncope  Otherwise feels fine      History provided by:  Patient and spouse   used: Yes (pt used a program on her phone  declined [employer])    Vaginal Bleeding  Quality:  Dark red  Severity:  Moderate  Onset quality:  Gradual  Timing:  Constant  Progression:  Waxing and waning  Chronicity:  New  Menstrual history: delivered in january  Context: spontaneously    Relieved by:  Nothing  Worsened by:  Nothing  Ineffective treatments:  None tried  Associated symptoms: abdominal pain    Associated symptoms: no back pain, no dizziness, no dysuria, no fatigue, no fever and no nausea    Risk factors comment:  Delivered in January      Prior to Admission Medications   Prescriptions Last Dose Informant Patient Reported? Taking?    Prenatal Vit-Fe Fumarate-FA (PRENATAL VITAMINS PO)  Self Yes No   Sig: Take by mouth   medroxyPROGESTERone (DEPO-PROVERA) 150 mg/mL injection   No No   Sig: Inject 1 mL (150 mg total) into a muscle every 3 (three) months   norethindrone (Ortho Micronor) 0 35 MG tablet   No No   Sig: Take 1 tablet (0 35 mg total) by mouth daily      Facility-Administered Medications: None       Past Medical History:   Diagnosis Date    Lupus ([address] )     cutaneous disease       Past Surgical History:   Procedure Laterality Date    CHOLECYSTECTOMY  2015       Family History   Problem Relation Age of Onset    No Known Problems Mother     Lupus Father     No Known Problems Sister     No Known Problems Brother     No Known Problems Son     No Known Problems Maternal Grandmother     No Known Problems Maternal Grandfather      I have reviewed and agree with the history as documented  E-Cigarette/Vaping    E-Cigarette Use Never User      E-Cigarette/Vaping Substances    Nicotine No     THC No     CBD No     Flavoring No     Other No     Unknown No      Social History     Tobacco Use    Smoking status: Never Smoker    Smokeless tobacco: Never Used   Vaping Use    Vaping Use: Never used   Substance Use Topics    Alcohol use: Not Currently    Drug use: Never       Review of Systems   Constitutional: Negative for activity change, appetite change, diaphoresis, fatigue and fever  Respiratory: Negative for chest tightness and shortness of breath  Cardiovascular: Negative for chest pain and palpitations  Gastrointestinal: Positive for abdominal pain  Negative for diarrhea, nausea and vomiting  Genitourinary: Positive for vaginal bleeding  Negative for dysuria  Musculoskeletal: Negative for back pain  Neurological: Negative for dizziness  All other systems reviewed and are negative  Physical Exam  Physical Exam  Vitals and nursing note reviewed  Constitutional:       Appearance: Normal appearance  HENT:      Mouth/Throat:      Mouth: Mucous membranes are moist    Eyes:      Conjunctiva/sclera: Conjunctivae normal    Cardiovascular:      Rate and Rhythm: Normal rate and regular rhythm  Pulmonary:      Effort: Pulmonary effort is normal       Breath sounds: Normal breath sounds  Abdominal:      General: Bowel sounds are normal       Palpations: Abdomen is soft  Tenderness: There is abdominal tenderness (mild) in the suprapubic area  There is no guarding or rebound  Skin:     General: Skin is warm  Capillary Refill: Capillary refill takes less than 2 seconds  Neurological:      General: No focal deficit present  Mental Status: She is alert     Psychiatric:         Mood and Affect: Mood normal          Vital Signs  ED Triage Vitals [06/06/22 0826]   Temperature Pulse Respirations Blood Pressure SpO2   98 5 °F (36 9 °C) 66 16 111/75 100 %      Temp Source Heart Rate Source Patient Position - Orthostatic VS BP Location FiO2 (%)   Oral Monitor Lying Left arm --      Pain Score       No Pain           Vitals:    06/06/22 0826 06/06/22 1048   BP: 111/75 112/75   Pulse: 66 70   Patient Position - Orthostatic VS: Lying Lying         Visual Acuity      ED Medications  Medications   ketorolac (TORADOL) injection 30 mg (30 mg Intramuscular Given 6/6/22 1018)       Diagnostic Studies  Results Reviewed     Procedure Component Value Units Date/Time    Urine Microscopic [[id number]]  (Abnormal) Collected: 06/06/22 0947    Lab Status: Final result Specimen: Urine, Clean Catch Updated: 06/06/22 1035     RBC, UA 0-1 /hpf      WBC, UA 1-2 /hpf      Epithelial Cells Occasional /hpf      Bacteria, UA Occasional /hpf      MUCUS THREADS Innumerable    POCT pregnancy, urine [[id number]]  (Normal) Resulted: 06/06/22 0950    Lab Status: Final result Updated: 06/06/22 0950     EXT PREG TEST UR (Ref: Negative) NEG(-)     Control VALID    Urine Macroscopic, POC [[id number]]  (Abnormal) Collected: 06/06/22 0947    Lab Status: Final result Specimen: Urine Updated: 06/06/22 0949     Color, UA Yellow     Clarity, UA Slightly Cloudy     pH, UA 5 5     Leukocytes, UA Negative     Nitrite, UA Negative     Protein, UA Negative mg/dl      Glucose, UA Negative mg/dl      Ketones, UA Negative mg/dl Urobilinogen, UA 0 2 E U /dl      Bilirubin, UA Negative     Blood, UA Moderate     Specific Gravity, UA >=1 030    Narrative:      CLINITEK RESULT                 No orders to display              Procedures  Procedures         ED Course  ED Course as of 06/06/22 1641   Mon Jun 06, 2022   0950 D/w pt and   After delivery can have issues w/ hormone adjustments that may give her some irregular bleeding  Instructed to continue her ocps as directed and contact gyn to see if they want to see her sooner  Given return precautions and pain medication instructions   0957 PREGNANCY TEST URINE: NEG(-)                               SBIRT 22yo+    Flowsheet Row Most Recent Value   SBIRT (25 yo +)    In order to provide better care to our patients, we are screening all of our patients for alcohol and drug use  Would it be okay to ask you these screening questions? No Filed at: 06/06/2022 0902                    [facility]  Number of Diagnoses or Management Options  Dysfunctional uterine bleeding: new and requires workup     Amount and/or Complexity of Data Reviewed  Clinical lab tests: reviewed and ordered  Obtain history from someone other than the patient: yes        Disposition  Final diagnoses:   Dysfunctional uterine bleeding     Time reflects when diagnosis was documented in both MDM as applicable and the Disposition within this note     Time User Action Codes Description Comment    6/6/2022  9:58 AM David ZIEGLER Add [N93 8] Dysfunctional uterine bleeding       ED Disposition     ED Disposition   Discharge    Condition   Stable    Date/Time   Mon Jun 6, 2022  9:59 AM    Comment   Sherman Blizzard Carpintor discharge to home/self care                 Follow-up Information     Follow up With Specialties Details Why 2 [facility] Call today to schedule a follow up appointment for further evaluation and treatment [facility] [address], [address] [place] [address] Rd, [address] [place] [address], [id number] [phone]          Discharge Medication List as of 6/6/2022 10:04 AM      CONTINUE these medications which have NOT CHANGED    Details   medroxyPROGESTERone (DEPO-PROVERA) 150 mg/mL injection Inject 1 mL (150 mg total) into a muscle every 3 (three) months, Starting Fri 2/11/2022, Normal      norethindrone (Ortho Micronor) 0 35 MG tablet Take 1 tablet (0 35 mg total) by mouth daily, Starting Thu 4/21/2022, Normal      Prenatal Vit-Fe Fumarate-FA (PRENATAL VITAMINS PO) Take by mouth, Historical Med             No discharge procedures on file      PDMP Review     None          ED Provider  Electronically Signed by           Deric Hansen DO  06/06/22 5834

## 2022-08-11 ENCOUNTER — ANNUAL EXAM (OUTPATIENT)
Dept: OBGYN CLINIC | Facility: CLINIC | Age: 27
End: 2022-08-11

## 2022-08-11 VITALS
HEIGHT: 62 IN | HEART RATE: 72 BPM | BODY MASS INDEX: 25.58 KG/M2 | WEIGHT: 139 LBS | DIASTOLIC BLOOD PRESSURE: 73 MMHG | SYSTOLIC BLOOD PRESSURE: 118 MMHG

## 2022-08-11 DIAGNOSIS — Z12.39 ENCOUNTER FOR BREAST CANCER SCREENING USING NON-MAMMOGRAM MODALITY: ICD-10-CM

## 2022-08-11 DIAGNOSIS — Z12.4 SCREENING FOR CERVICAL CANCER: ICD-10-CM

## 2022-08-11 DIAGNOSIS — Z11.3 SCREEN FOR STD (SEXUALLY TRANSMITTED DISEASE): ICD-10-CM

## 2022-08-11 DIAGNOSIS — Z01.411 ENCOUNTER FOR GYNECOLOGICAL EXAMINATION WITH ABNORMAL FINDING: Primary | ICD-10-CM

## 2022-08-11 DIAGNOSIS — R10.2 PELVIC PAIN: ICD-10-CM

## 2022-08-11 DIAGNOSIS — Z30.09 UNWANTED FERTILITY: ICD-10-CM

## 2022-08-11 PROCEDURE — 87491 CHLMYD TRACH DNA AMP PROBE: CPT | Performed by: NURSE PRACTITIONER

## 2022-08-11 PROCEDURE — 99395 PREV VISIT EST AGE 18-39: CPT | Performed by: NURSE PRACTITIONER

## 2022-08-11 PROCEDURE — 87591 N.GONORRHOEAE DNA AMP PROB: CPT | Performed by: NURSE PRACTITIONER

## 2022-08-11 RX ORDER — NORETHINDRONE ACETATE AND ETHINYL ESTRADIOL 1MG-20(21)
1 KIT ORAL DAILY
Qty: 28 TABLET | Refills: 3 | Status: SHIPPED | OUTPATIENT
Start: 2022-08-11

## 2022-08-11 NOTE — PROGRESS NOTES
Lydia Fishman is a 32 y o  female who presents today for annual GYN exam   Her last pap smear was performed 8/3/2021 and result was NILM  She reports no history of abnormal pap smears in her past   She reports menses as irregular  She is no longer breastfeeding  Her general medical history has been reviewed and she reports it as follows:    Past Medical History:   Diagnosis Date    Lupus ([address] )     cutaneous disease     Past Surgical History:   Procedure Laterality Date    CHOLECYSTECTOMY       OB History        3    Para   3    Term   3       0    AB   0    Living   4       SAB   0    IAB   0    Ectopic   0    Multiple   1    Live Births   4               Social History     Tobacco Use    Smoking status: Never Smoker    Smokeless tobacco: Never Used   Vaping Use    Vaping Use: Never used   Substance Use Topics    Alcohol use: Not Currently    Drug use: Never     Social History     Substance and Sexual Activity   Sexual Activity Yes    Partners: Female     Cancer-related family history is not on file  Current Outpatient Medications   Medication Instructions    norethindrone (ORTHO MICRONOR) 0 35 mg, Oral, Daily       Review of Systems:  Review of Systems   Constitutional: Negative  Gastrointestinal: Negative  Genitourinary: Positive for menstrual problem and pelvic pain  Negative for difficulty urinating and vaginal discharge  Reports pelvic pain off/on approximately every 3-4 days which lasts up to 6 hours since delivery of twins  Continues with frequent AUB since delivery despite Depoprovera x1 injection and Micronor OCP's for the past 3-4 months  Skin: Negative  Physical Exam:  /73   Pulse 72   Ht 5' 2" (1 575 m)   Wt 63 kg (139 lb)   LMP  (LMP Unknown)   BMI 25 42 kg/m²   Physical Exam  Constitutional:       General: She is not in acute distress  Appearance: She is well-developed     Genitourinary: Vulva normal       No lesions in the vagina  Right Adnexa: tender  Right Adnexa: no mass present  Left Adnexa: tender  Left Adnexa: no mass present  No cervical motion tenderness or lesion  Uterus is not tender  Breasts:      Right: No mass, nipple discharge, skin change or tenderness  Left: No mass, nipple discharge, skin change or tenderness  Neck:      Thyroid: No thyromegaly  Cardiovascular:      Rate and Rhythm: Normal rate and regular rhythm  Pulmonary:      Effort: Pulmonary effort is normal    Abdominal:      Palpations: Abdomen is soft  Tenderness: There is no abdominal tenderness  Musculoskeletal:      Cervical back: Neck supple  Neurological:      Mental Status: She is alert and oriented to person, place, and time  Skin:     General: Skin is warm and dry  Vitals reviewed  Assessment/Plan:   1  Abnormal well-woman GYN exam   2  Cervical cancer screening:  Normal cervical exam   Pap smear not indicated at this time  3  STD screening:  Orders placed for vaginal GC/CT cultures  Orders placed for serum anti-HIV, anti-HCV, HbsAg, RPR    4  Breast cancer screening:  Normal breast exam   Reviewed breast self-awareness  5  Depression Screening: Patient's depression screening was assessed with a PHQ-2 score of 0  Their PHQ-9 score was 0  Clinically patient does not have depression  No treatment is required  6  BMI Counseling: Body mass index is 25 42 kg/m²  No intervention indicated  7  Contraception:  Progesterone-only OCP's  Advised switch to combined OCP's as no longer breastfeeding and experiencing AUB  Given Rx combined OCP's and instructed on appropriate administration  Return in 3 months for pill check  8  Pelvic pain:  Order placed for pelvic ultrasound  Return 1 week after performed to review results     9  Return to office in 1 year for annual GYN exam     Reviewed with patient that test results are available in [address] immediately, but that they will not necessarily be reviewed by me immediately  Explained that I will review results at my earliest opportunity and contact patient appropriately

## 2022-08-11 NOTE — LETTER
8/15/2022    To Riky Montesinos  : 1995      This letter is to advise you that your recent CULTURES for gonorrhea and chlamydia were reviewed by me and are NORMAL  Please contact the office for an appointment if you have any additional concerns      ANDREAS Tolbert

## 2022-08-11 NOTE — PATIENT INSTRUCTIONS
Ashley por rios confianza en nuestro equipo  Linda Blackwell y agradecemos amanda comentarios  Si recibe kolton encuesta nuestra, tómese unos momentos para informarnos cómo estamos     Sinceramente,  ANDREAS Del Castillo

## 2022-08-13 LAB
C TRACH DNA SPEC QL NAA+PROBE: NEGATIVE
N GONORRHOEA DNA SPEC QL NAA+PROBE: NEGATIVE

## 2022-09-23 ENCOUNTER — HOSPITAL ENCOUNTER (OUTPATIENT)
Dept: ULTRASOUND IMAGING | Facility: HOSPITAL | Age: 27
End: 2022-09-23
Attending: NURSE PRACTITIONER

## 2022-09-23 DIAGNOSIS — R10.2 PELVIC PAIN: ICD-10-CM

## 2022-09-23 PROCEDURE — 76830 TRANSVAGINAL US NON-OB: CPT

## 2022-09-23 PROCEDURE — 76856 US EXAM PELVIC COMPLETE: CPT

## 2022-09-30 ENCOUNTER — OFFICE VISIT (OUTPATIENT)
Dept: OBGYN CLINIC | Facility: CLINIC | Age: 27
End: 2022-09-30

## 2022-09-30 VITALS
DIASTOLIC BLOOD PRESSURE: 72 MMHG | HEART RATE: 83 BPM | BODY MASS INDEX: 24.91 KG/M2 | SYSTOLIC BLOOD PRESSURE: 106 MMHG | WEIGHT: 136.2 LBS

## 2022-09-30 DIAGNOSIS — Z71.2 ENCOUNTER TO DISCUSS TEST RESULTS: Primary | ICD-10-CM

## 2022-09-30 PROCEDURE — 99213 OFFICE O/P EST LOW 20 MIN: CPT | Performed by: OBSTETRICS & GYNECOLOGY

## 2022-09-30 NOTE — PROGRESS NOTES
PROBLEM GYNECOLOGICAL VISIT    Phillip Neal is a 32 y o  female who presents today   Her general medical history has been reviewed and she reports it as follows:    Past Medical History:   Diagnosis Date    Lupus ([address] )     cutaneous disease     Past Surgical History:   Procedure Laterality Date    CHOLECYSTECTOMY       OB History        3    Para   3    Term   3       0    AB   0    Living   4       SAB   0    IAB   0    Ectopic   0    Multiple   1    Live Births   4               Social History     Tobacco Use    Smoking status: Never Smoker    Smokeless tobacco: Never Used   Vaping Use    Vaping Use: Never used   Substance Use Topics    Alcohol use: Not Currently    Drug use: Never     Social History     Substance and Sexual Activity   Sexual Activity Yes    Partners: Female       Current Outpatient Medications   Medication Instructions    norethindrone-ethinyl estradiol (JUNEL FE 1/20) 1-20 MG-MCG per tablet 1 tablet, Oral, Daily       History of Present Illness:   Patient presents for follow up s/p pelvic sonogram for pelvic pain with no new complaints  Patient that the new OCP has not given her menses yet since the pack is done  Review of Systems:  Review of Systems   All other systems reviewed and are negative  Physical Exam:  /72   Pulse 83   Wt 61 8 kg (136 lb 3 2 oz)   BMI 24 91 kg/m²   Physical Exam  Constitutional:       Appearance: Normal appearance  Neurological:      Mental Status: She is alert  Vitals reviewed  Discussion:  Discuss with patient with Tally Goldberg it is a low dose OCP and it is normal not to get a period  Discuss with patient that pelvic sonogram is normal     Assessment:   1  Normal pelvic sonogram    Plan:   1  Return to office prn    Reviewed with patient that [facility] immediately, but that they will not necessarily be reviewed by me immediately    Explained that I will review results at my earliest opportunity and contact patient appropriately

## 2022-09-30 NOTE — PATIENT INSTRUCTIONS
Ashley por rios confianza en nuestro equipo  Edna Balling y agradecemos amanda comentarios  Si recibe kolton encuesta nuestra, tómese unos momentos para informarnos cómo estamos     Sinceramente,  Chancey Lopes Toussaint-Foster

## 2022-11-11 ENCOUNTER — OFFICE VISIT (OUTPATIENT)
Dept: OBGYN CLINIC | Facility: CLINIC | Age: 27
End: 2022-11-11

## 2022-11-11 VITALS
SYSTOLIC BLOOD PRESSURE: 112 MMHG | WEIGHT: 130.2 LBS | HEIGHT: 62 IN | BODY MASS INDEX: 23.96 KG/M2 | HEART RATE: 55 BPM | DIASTOLIC BLOOD PRESSURE: 75 MMHG

## 2022-11-11 DIAGNOSIS — R21 FACIAL RASH: ICD-10-CM

## 2022-11-11 DIAGNOSIS — Z30.09 UNWANTED FERTILITY: ICD-10-CM

## 2022-11-11 DIAGNOSIS — Z30.41 ENCOUNTER FOR SURVEILLANCE OF CONTRACEPTIVE PILLS: Primary | ICD-10-CM

## 2022-11-11 RX ORDER — NORETHINDRONE ACETATE AND ETHINYL ESTRADIOL 1MG-20(21)
1 KIT ORAL DAILY
Qty: 28 TABLET | Refills: 10 | Status: SHIPPED | OUTPATIENT
Start: 2022-11-11

## 2022-11-11 RX ORDER — CLOBETASOL PROPIONATE 0.5 MG/G
CREAM TOPICAL 2 TIMES DAILY PRN
Qty: 30 G | Refills: 1 | Status: SHIPPED | OUTPATIENT
Start: 2022-11-11

## 2022-11-11 NOTE — PATIENT INSTRUCTIONS
Ashley por rios confianza en nuestro equipo  Kori Edwards y agradecemos amanda comentarios  Si recibe kolton encuesta nuestra, tómese unos momentos para informarnos cómo estamos     Sinceramente,  ANDREAS Pereyra

## 2022-11-11 NOTE — PROGRESS NOTES
Dasha Caldera presents today for pill check  She reports that she has been using combined OCP's for the past 3 months and menses have been very regular and pelvic pain has resolved  She is very happy with the OCP's and desires to continue with them  /75   Pulse 55   Ht 5' 2" (1 575 m)   Wt 59 1 kg (130 lb 3 2 oz)   LMP 10/21/2022 (Approximate)   BMI 23 81 kg/m²      Rx refills sent to pharmacy    Return as previously scheduled for annual GYN exam

## 2023-07-29 DIAGNOSIS — R21 FACIAL RASH: ICD-10-CM

## 2023-07-31 RX ORDER — CLOBETASOL PROPIONATE 0.5 MG/G
CREAM TOPICAL 2 TIMES DAILY PRN
Qty: 30 G | Refills: 0 | Status: SHIPPED | OUTPATIENT
Start: 2023-07-31

## 2023-08-18 ENCOUNTER — ANNUAL EXAM (OUTPATIENT)
Dept: OBGYN CLINIC | Facility: CLINIC | Age: 28
End: 2023-08-18

## 2023-08-18 VITALS
HEIGHT: 62 IN | SYSTOLIC BLOOD PRESSURE: 113 MMHG | HEART RATE: 65 BPM | WEIGHT: 136.2 LBS | BODY MASS INDEX: 25.06 KG/M2 | DIASTOLIC BLOOD PRESSURE: 77 MMHG

## 2023-08-18 DIAGNOSIS — Z12.39 ENCOUNTER FOR BREAST CANCER SCREENING USING NON-MAMMOGRAM MODALITY: ICD-10-CM

## 2023-08-18 DIAGNOSIS — Z12.4 SCREENING FOR CERVICAL CANCER: ICD-10-CM

## 2023-08-18 DIAGNOSIS — Z23 NEED FOR HPV VACCINATION: ICD-10-CM

## 2023-08-18 DIAGNOSIS — Z30.09 UNWANTED FERTILITY: ICD-10-CM

## 2023-08-18 DIAGNOSIS — Z01.419 ENCOUNTER FOR GYNECOLOGICAL EXAMINATION WITHOUT ABNORMAL FINDING: Primary | ICD-10-CM

## 2023-08-18 PROCEDURE — 99395 PREV VISIT EST AGE 18-39: CPT | Performed by: NURSE PRACTITIONER

## 2023-08-18 PROCEDURE — 90471 IMMUNIZATION ADMIN: CPT | Performed by: NURSE PRACTITIONER

## 2023-08-18 PROCEDURE — 90651 9VHPV VACCINE 2/3 DOSE IM: CPT | Performed by: NURSE PRACTITIONER

## 2023-08-18 RX ORDER — NORETHINDRONE ACETATE AND ETHINYL ESTRADIOL 1MG-20(21)
1 KIT ORAL DAILY
Qty: 28 TABLET | Refills: 12 | Status: SHIPPED | OUTPATIENT
Start: 2023-08-18

## 2023-08-18 NOTE — PROGRESS NOTES
ANNUAL GYNECOLOGICAL EXAMINATION    Hilda Bellamy is a 32 y.o. female who presents today for annual GYN exam.  Her last pap smear was performed 8/3/2021 and result was NILM. She reports no history of abnormal pap smears in her past.  She reports menses as regular. Patient's last menstrual period was 2023. Her general medical history has been reviewed and she reports it as follows:    Past Medical History:   Diagnosis Date   • Lupus ([address])     cutaneous disease     Past Surgical History:   Procedure Laterality Date   • CHOLECYSTECTOMY       OB History        3    Para   3    Term   3       0    AB   0    Living   4       SAB   0    IAB   0    Ectopic   0    Multiple   1    Live Births   4               Social History     Tobacco Use   • Smoking status: Never   • Smokeless tobacco: Never   Vaping Use   • Vaping Use: Never used   Substance Use Topics   • Alcohol use: Never   • Drug use: Never     Social History     Substance and Sexual Activity   Sexual Activity Yes   • Partners: Male   • Birth control/protection: OCP     Cancer-related family history is negative for Breast cancer, Colon cancer, Ovarian cancer, and Cancer. Current Outpatient Medications   Medication Instructions   • clobetasol (TEMOVATE) 0.05 % cream Topical, 2 times daily PRN   • norethindrone-ethinyl estradiol (JUNEL FE 1/20) 1-20 MG-MCG per tablet 1 tablet, Oral, Daily       Review of Systems:  Review of Systems   Constitutional: Negative. Gastrointestinal: Negative. Genitourinary: Negative for difficulty urinating, menstrual problem, pelvic pain and vaginal discharge. Skin: Negative. Physical Exam:  /77   Pulse 65   Ht 5' 2" (1.575 m)   Wt 61.8 kg (136 lb 3.2 oz)   LMP 2023   BMI 24.91 kg/m²   Physical Exam  Constitutional:       General: She is not in acute distress. Appearance: She is well-developed. Genitourinary:      Vulva normal.      No lesions in the vagina. Right Adnexa: not tender and no mass present. Left Adnexa: not tender and no mass present. No cervical motion tenderness or lesion. Uterus is not tender. Breasts:     Right: No mass, nipple discharge, skin change or tenderness. Left: No mass, nipple discharge, skin change or tenderness. Neck:      Thyroid: No thyromegaly. Cardiovascular:      Rate and Rhythm: Normal rate and regular rhythm. Pulmonary:      Effort: Pulmonary effort is normal.   Abdominal:      Palpations: Abdomen is soft. Tenderness: There is no abdominal tenderness. Musculoskeletal:      Cervical back: Neck supple. Neurological:      Mental Status: She is alert and oriented to person, place, and time. Skin:     General: Skin is warm and dry. Vitals reviewed. Assessment/Plan:   1. Normal well-woman GYN exam.  2. Cervical cancer screening:  Normal cervical exam.  Pap smear not indicated at this time. Has not received HPV vaccine in the past, but she desires to initiate series now. Given HPV vaccine today and she will return in 2 and 6 months for subsequent vaccinations. 3. STD screening:  Patient declines. 4. Breast cancer screening:  Normal breast exam.  Reviewed breast self-awareness. 5. Depression Screening: Patient's depression screening was assessed with a PHQ-2 score of 0. Their PHQ-9 score was 0. Clinically patient does not have depression. No treatment is required. 6. BMI Counseling: Body mass index is 24.91 kg/m². No intervention indicated. 7. Contraception:  OCP's. Given Rx refills for another year. 8. Return to office in 1 year for another year.

## 2023-08-18 NOTE — PATIENT INSTRUCTIONS
Ashley por rios confianza en nuestro equipo. Jerlene Deacon y agradecemos amanda comentarios. Si recibe kolton encuesta nuestra, tómese unos momentos para informarnos cómo estamos.    Sinceramente,  Xochitl Natty Jansenia Pin

## 2023-10-23 ENCOUNTER — CLINICAL SUPPORT (OUTPATIENT)
Dept: OBGYN CLINIC | Facility: CLINIC | Age: 28
End: 2023-10-23

## 2023-10-23 VITALS
DIASTOLIC BLOOD PRESSURE: 85 MMHG | HEART RATE: 72 BPM | BODY MASS INDEX: 25.14 KG/M2 | WEIGHT: 136.6 LBS | HEIGHT: 62 IN | SYSTOLIC BLOOD PRESSURE: 129 MMHG

## 2023-10-23 DIAGNOSIS — Z23 NEED FOR HPV VACCINE: Primary | ICD-10-CM

## 2023-10-23 NOTE — PROGRESS NOTES
Patient given 2nd HPV on left deltoid on 10/23/23    NDC# 7609-3697-70  LOT# [id number]  EXP# [id number]

## 2023-11-07 ENCOUNTER — HOSPITAL ENCOUNTER (EMERGENCY)
Facility: HOSPITAL | Age: 28
Discharge: HOME | End: 2023-11-07
Attending: EMERGENCY MEDICINE

## 2023-11-07 VITALS
TEMPERATURE: 98.5 F | HEART RATE: 80 BPM | RESPIRATION RATE: 20 BRPM | OXYGEN SATURATION: 99 % | SYSTOLIC BLOOD PRESSURE: 148 MMHG | DIASTOLIC BLOOD PRESSURE: 85 MMHG

## 2023-11-07 DIAGNOSIS — R21 FACIAL RASH: ICD-10-CM

## 2023-11-07 DIAGNOSIS — R21 RASH AND NONSPECIFIC SKIN ERUPTION: Primary | ICD-10-CM

## 2023-11-07 PROCEDURE — 99284 EMERGENCY DEPT VISIT MOD MDM: CPT | Performed by: EMERGENCY MEDICINE

## 2023-11-07 PROCEDURE — 99282 EMERGENCY DEPT VISIT SF MDM: CPT

## 2023-11-07 RX ORDER — CLOBETASOL PROPIONATE 0.5 MG/G
CREAM TOPICAL 2 TIMES DAILY PRN
Qty: 30 G | Refills: 0 | Status: SHIPPED | OUTPATIENT
Start: 2023-11-07 | End: 2023-11-21

## 2023-11-07 RX ORDER — PREDNISONE 20 MG/1
40 TABLET ORAL ONCE
Status: COMPLETED | OUTPATIENT
Start: 2023-11-07 | End: 2023-11-07

## 2023-11-07 RX ORDER — PREDNISONE 20 MG/1
40 TABLET ORAL DAILY
Qty: 8 TABLET | Refills: 0 | Status: SHIPPED | OUTPATIENT
Start: 2023-11-07 | End: 2023-11-11

## 2023-11-07 RX ADMIN — PREDNISONE 40 MG: 20 TABLET ORAL at 16:44

## 2023-11-07 NOTE — ED PROVIDER NOTES
History  Chief Complaint   Patient presents with    Rash     Pt reports rash on face since today. Pt states she thinks its d/t lupus since she hasn't been receiving treatment for it. HPI    Prior to Admission Medications   Prescriptions Last Dose Informant Patient Reported? Taking? clobetasol (TEMOVATE) 0.05 % cream   No No   Sig: Apply topically 2 (two) times a day as needed (facial rash)   norethindrone-ethinyl estradiol (JUNEL FE 1/20) 1-20 MG-MCG per tablet   No No   Sig: Take 1 tablet by mouth daily      Facility-Administered Medications: None       Past Medical History:   Diagnosis Date    Lupus ([address])     cutaneous disease       Past Surgical History:   Procedure Laterality Date    CHOLECYSTECTOMY  2015       Family History   Problem Relation Age of Onset    No Known Problems Mother     Lupus Father     No Known Problems Sister     No Known Problems Brother     No Known Problems Son     No Known Problems Maternal Grandmother     No Known Problems Maternal Grandfather     Breast cancer Neg Hx     Colon cancer Neg Hx     Ovarian cancer Neg Hx     Cancer Neg Hx      I have reviewed and agree with the history as documented.     E-Cigarette/Vaping    E-Cigarette Use Never User      E-Cigarette/Vaping Substances     Social History     Tobacco Use    Smoking status: Never    Smokeless tobacco: Never   Vaping Use    Vaping Use: Never used   Substance Use Topics    Alcohol use: Never    Drug use: Never       Review of Systems    Physical Exam  Physical Exam    Vital Signs  ED Triage Vitals [11/07/23 1554]   Temperature Pulse Respirations Blood Pressure SpO2   98.5 °F (36.9 °C) 80 20 148/85 99 %      Temp Source Heart Rate Source Patient Position - Orthostatic VS BP Location FiO2 (%)   Oral Monitor -- Right arm --      Pain Score       --           Vitals:    11/07/23 1554   BP: 148/85   Pulse: 80         Visual Acuity      ED Medications  Medications - No data to display    Diagnostic Studies  Results Reviewed None                   No orders to display              Procedures  Procedures         ED Course                               SBIRT 20yo+      Flowsheet Row Most Recent Value   Initial Alcohol Screen: US AUDIT-C     1. How often do you have a drink containing alcohol? 0 Filed at: 11/07/2023 1605   2. How many drinks containing alcohol do you have on a typical day you are drinking? 0 Filed at: 11/07/2023 1605   3a. Male UNDER 65: How often do you have five or more drinks on one occasion? 0 Filed at: 11/07/2023 1605   3b. FEMALE Any Age, or MALE 65+: How often do you have 4 or more drinks on one occassion? 0 Filed at: 11/07/2023 1605   Audit-C Score 0 Filed at: 11/07/2023 1605   DAVID: How many times in the past year have you. .. Used an illegal drug or used a prescription medication for non-medical reasons? Never Filed at: 11/07/2023 1605                      MDM         Disposition  Final diagnoses:   None     ED Disposition       None          Follow-up Information    None         Patient's Medications   Discharge Prescriptions    No medications on file       No discharge procedures on file.     PDMP Review       None            ED Provider  Electronically Signed by supple. Skin:     General: Skin is warm and dry. Capillary Refill: Capillary refill takes less than 2 seconds. Comments: Erythematous, blanching, facial rash, primarily periorbital bilaterally. No involvement of the orbit itself. No crepitus or drainage. Somewhat warm, nontender. Neurological:      General: No focal deficit present. Mental Status: She is alert. Psychiatric:         Mood and Affect: Mood normal.         Vital Signs  ED Triage Vitals [11/07/23 1554]   Temperature Pulse Respirations Blood Pressure SpO2   98.5 °F (36.9 °C) 80 20 148/85 99 %      Temp Source Heart Rate Source Patient Position - Orthostatic VS BP Location FiO2 (%)   Oral Monitor -- Right arm --      Pain Score       --           Vitals:    11/07/23 1554   BP: 148/85   Pulse: 80         Visual Acuity      ED Medications  Medications   predniSONE tablet 40 mg (40 mg Oral Given 11/7/23 1644)       Diagnostic Studies  Results Reviewed       None                   No orders to display              Procedures  Procedures         ED Course                               SBIRT 22yo+      Flowsheet Row Most Recent Value   Initial Alcohol Screen: US AUDIT-C     1. How often do you have a drink containing alcohol? 0 Filed at: 11/07/2023 1605   2. How many drinks containing alcohol do you have on a typical day you are drinking? 0 Filed at: 11/07/2023 1605   3a. Male UNDER 65: How often do you have five or more drinks on one occasion? 0 Filed at: 11/07/2023 1605   3b. FEMALE Any Age, or MALE 65+: How often do you have 4 or more drinks on one occassion? 0 Filed at: 11/07/2023 1605   Audit-C Score 0 Filed at: 11/07/2023 1605   DAVID: How many times in the past year have you. .. Used an illegal drug or used a prescription medication for non-medical reasons? Never Filed at: 11/07/2023 1605                      Medical Decision Making  26-year-old female with facial rash, similar to previous lupus flares.   No evidence of superimposed infection. Plan for p.o. and topical steroids, discharged with referral to rheumatology, discussed return precautions. Amount and/or Complexity of Data Reviewed  Discussion of management or test interpretation with external provider(s): Discussed with rheumatology on-call, who reviewed clinical image. Although rashes not classic malar rash, given that patient reports similar to previous, will treat with oral and topical steroids. Risk  Prescription drug management. Disposition  Final diagnoses:   Rash and nonspecific skin eruption     Time reflects when diagnosis was documented in both MDM as applicable and the Disposition within this note       Time User Action Codes Description Comment    11/7/2023  4:41 PM Radha ZIEGLER Add [R21] Rash and nonspecific skin eruption     11/7/2023  4:42 PM Spencer Izaguirre Add [R21] Facial rash           ED Disposition       ED Disposition   Discharge    Condition   Stable    Date/Time   Tue Nov 7, 2023 [address] [place] discharge to home/self care.                    Follow-up Information       Follow up With Specialties Details Why Contact Info Additional Information    [facility]   [address]  [facility] [address] [facility], [address] [place], [place], [address]    [phone]            Discharge Medication List as of 11/7/2023  4:44 PM        START taking these medications    Details   predniSONE 20 mg tablet Take 2 tablets (40 mg total) by mouth daily for 4 days, Starting Tue 11/7/2023, Until Sat 11/11/2023, Normal           CONTINUE these medications which have CHANGED    Details   clobetasol (TEMOVATE) 0.05 % cream Apply topically 2 (two) times a day as needed (facial rash) for up to 14 days, Starting Tue 11/7/2023, Until Tue 11/21/2023 at 2359, Normal           CONTINUE these medications which have NOT CHANGED    Details   norethindrone-ethinyl estradiol (JUNEL FE 1/20) 1-20 MG-MCG per tablet Take 1 tablet by mouth daily, Starting Fri 8/18/2023, Normal                 PDMP Review       None            ED Provider  Electronically Signed by             Linda Bustamante MD  11/29/23 2754

## 2023-11-07 NOTE — Clinical Note
Lisseth Bernabe was seen and treated in our emergency department on 11/7/2023. Diagnosis:     WENDIE  may return to work on return date. She may return on this date: 11/08/2023         If you have any questions or concerns, please don't hesitate to call.       Tejal Villaseñor MD    ______________________________           _______________          _______________  Hospital Representative                              Date                                Time

## 2024-03-07 ENCOUNTER — CONSULT (OUTPATIENT)
Dept: RHEUMATOLOGY | Facility: CLINIC | Age: 29
End: 2024-03-07

## 2024-03-07 VITALS
WEIGHT: 133 LBS | HEIGHT: 62 IN | OXYGEN SATURATION: 100 % | DIASTOLIC BLOOD PRESSURE: 70 MMHG | BODY MASS INDEX: 24.48 KG/M2 | HEART RATE: 69 BPM | SYSTOLIC BLOOD PRESSURE: 108 MMHG

## 2024-03-07 DIAGNOSIS — R21 FACIAL RASH: ICD-10-CM

## 2024-03-07 DIAGNOSIS — L93.2 CUTANEOUS LUPUS ERYTHEMATOSUS: Primary | ICD-10-CM

## 2024-03-07 DIAGNOSIS — R21 RASH AND NONSPECIFIC SKIN ERUPTION: ICD-10-CM

## 2024-03-07 RX ORDER — PREDNISONE 5 MG/1
TABLET ORAL
Qty: 70 TABLET | Refills: 0 | Status: SHIPPED | OUTPATIENT
Start: 2024-03-07

## 2024-03-07 NOTE — PROGRESS NOTES
This is a Rheumatology Consult seen at the request of Dr. Awad    History obtained via language Line      HPI: Aundrea Montesinos is a 27 y/o female who presents for further evaluation of rash. She has past medical history cutaneous lupus    She mentions she was seen also seen by a dermatologist in [place] in 2021.      Per patient she was living in [place] where she was told she has cutaneous lupus per dermatology. She was prescribed oral prednisone and topical steroid medications    No records available.    Denies joint pain,swelling or stiffness, Raynaud's, sicca    No prior h/o seizures or strokes, renal failure, pleural-pericardial effusion, PE/DVT            --------------------------------------------------------------------------------------------------------        ROS:        - for: Fevers, Chills or sweats.  No HAs or scalp tenderness.  No jaw claudication.  No acute visual or eye changes.  No dry eyes.  No auditory complaints.  No oral lesions or ulcers.  No dry mouth.  No sore throat or cough.  No chest pains or palpitations.  No shortness of breath, dyspnea on exertion or wheezing.  No hemotpysis.  No abdominal pain, GERD symptoms, diarrhea or constipation.  No urinary complaints.  No numbness, tingling or weakness.  No rashes.    All other ROS was reviewed and negative except as above         --------------------------------------------------------------------------------------------------------    Past Medical History    Past Medical History:   Diagnosis Date    Lupus (HCC)     cutaneous disease           Past Surgical History    Past Surgical History:   Procedure Laterality Date    CHOLECYSTECTOMY  2015           Family History    Family History   Problem Relation Age of Onset    No Known Problems Mother     Lupus Father     No Known Problems Sister     No Known Problems Brother     No Known Problems Son     No Known Problems Maternal Grandmother     No Known Problems Maternal Grandfather      "Breast cancer Neg Hx     Colon cancer Neg Hx     Ovarian cancer Neg Hx     Cancer Neg Hx             Social History    Social History     Tobacco Use    Smoking status: Never    Smokeless tobacco: Never   Vaping Use    Vaping status: Never Used   Substance Use Topics    Alcohol use: Never    Drug use: Never     Not currently working     Allergies    No Known Allergies      Medications    Current Outpatient Medications   Medication Instructions    clobetasol (TEMOVATE) 0.05 % cream Topical, 2 times daily PRN    norethindrone-ethinyl estradiol (JUNEL FE 1/20) 1-20 MG-MCG per tablet 1 tablet, Oral, Daily          Physical Exam    /70   Pulse 69   Ht 5' 2\" (1.575 m)   Wt 60.3 kg (133 lb)   SpO2 100%   BMI 24.33 kg/m²     GEN: AAO, No apparent distress.  Patient is well developed.  HEENT:  Pupils are equal, round and reactive.  Sclera are clear.  Fundoscopic exam is normal.  External ears are without lesions.  Oral pharynx is clear of ulcers or other lesions.  MMM.   NECK:  Supple.  There is no adenopathy appreciable in anterior or posterior cervical chains or supraclavicularly.  JVP is normal.    HEART: Regular rate and rhythm.  There is no appreciable murmur, gallop or rub.  LUNGS: Clear to auscultation.  ABD:  Soft, without tenderness, rebound or guarding.  No appreciable organomegally.  NEURO: Speech and cognition are normal.  Strength is 5/5 throughout.  Tone is normal.  DTRs are 2/4 at the knees, ankles and elbows.  Gait is normal.  SKIN: There are no rashes or lesions    MUSCULOSKELETAL:   + facial rash      ________________________________________________________________________          Results Review    Component      Latest Ref [facility] 1/20/2022   WBC      4.31 - 10.16 Thousand/uL 8.74    RBC      3.81 - 5.12 Million/uL 4.18    Hemoglobin      11.5 - 15.4 g/dL 11.5    Hematocrit      34.8 - 46.1 % 35.8    MCV      82 - 98 fL 86    MCH      26.8 - 34.3 pg 27.5    MCHC      31.4 - 37.4 g/dL 32.1    RDW    "   11.6 - 15.1 % 14.2    Platelet Count      149 - 390 Thousands/uL 223    MPV      8.9 - 12.7 fL 10.4            Impressions and Plans:    Aundrea Montesinos is a 29 y/o female with h/o cutaneous lupus was following with dermatology in [place] and also in [place]    She was prescribed oral prednisone and topical steroid medications    At this time no other s/s SLE    Check complete CALEB profile, UA,complements    Refer to derm    Will review test results and f/u as needed      Thank you for involving me in this patient's care.        Dante López MD  [facility]

## 2024-08-30 DIAGNOSIS — Z30.09 UNWANTED FERTILITY: ICD-10-CM

## 2024-08-30 RX ORDER — NORETHINDRONE ACETATE AND ETHINYL ESTRADIOL 1MG-20(21)
1 KIT ORAL DAILY
Qty: 28 TABLET | Refills: 2 | Status: SHIPPED | OUTPATIENT
Start: 2024-08-30

## 2024-11-07 ENCOUNTER — ANNUAL EXAM (OUTPATIENT)
Dept: OBGYN CLINIC | Facility: CLINIC | Age: 29
End: 2024-11-07

## 2024-11-07 VITALS
WEIGHT: 128 LBS | DIASTOLIC BLOOD PRESSURE: 71 MMHG | HEART RATE: 56 BPM | SYSTOLIC BLOOD PRESSURE: 122 MMHG | BODY MASS INDEX: 23.41 KG/M2

## 2024-11-07 DIAGNOSIS — Z12.4 SCREENING FOR CERVICAL CANCER: ICD-10-CM

## 2024-11-07 DIAGNOSIS — Z30.09 UNWANTED FERTILITY: ICD-10-CM

## 2024-11-07 DIAGNOSIS — Z12.39 ENCOUNTER FOR BREAST CANCER SCREENING USING NON-MAMMOGRAM MODALITY: ICD-10-CM

## 2024-11-07 DIAGNOSIS — Z01.419 ENCOUNTER FOR GYNECOLOGICAL EXAMINATION WITHOUT ABNORMAL FINDING: Primary | ICD-10-CM

## 2024-11-07 PROCEDURE — 99395 PREV VISIT EST AGE 18-39: CPT | Performed by: NURSE PRACTITIONER

## 2024-11-07 PROCEDURE — G0145 SCR C/V CYTO,THINLAYER,RESCR: HCPCS | Performed by: NURSE PRACTITIONER

## 2024-11-07 RX ORDER — NORETHINDRONE ACETATE AND ETHINYL ESTRADIOL 1MG-20(21)
1 KIT ORAL DAILY
Qty: 28 TABLET | Refills: 12 | Status: SHIPPED | OUTPATIENT
Start: 2024-11-07

## 2024-11-07 NOTE — PROGRESS NOTES
ANNUAL GYNECOLOGICAL EXAMINATION    Aundrea Montesinos is a 28 y.o. female who presents today for annual GYN exam.  Her last pap smear was performed 8/3/2021 and result was NILMI.  She reports no history of abnormal pap smears in her past.  She had HIV screening performed 2021 and it was negative.  She reports menses as regular.  Patient's last menstrual period was 2024 (approximate).  Her general medical history has been reviewed and she reports it as follows:    Past Medical History:   Diagnosis Date    Lupus     cutaneous disease     Past Surgical History:   Procedure Laterality Date    CHOLECYSTECTOMY       OB History          3    Para   3    Term   3       0    AB   0    Living   4         SAB   0    IAB   0    Ectopic   0    Multiple   1    Live Births   4               Social History     Tobacco Use    Smoking status: Never    Smokeless tobacco: Never   Vaping Use    Vaping status: Never Used   Substance Use Topics    Alcohol use: Never    Drug use: Never     Social History     Substance and Sexual Activity   Sexual Activity Yes    Partners: Male    Birth control/protection: OCP     Cancer-related family history is negative for Breast cancer, Colon cancer, Ovarian cancer, and Cancer.    Current Outpatient Medications   Medication Instructions    norethindrone-ethinyl estradiol ([place]is[place] FE ) 1-20 MG-MCG per tablet 1 tablet, Oral, Daily       Review of Systems:  Review of Systems   Constitutional: Negative.    Gastrointestinal: Negative.    Genitourinary:  Negative for difficulty urinating, menstrual problem, pelvic pain and vaginal discharge.   Skin: Negative.        Physical Exam:  /71 (BP Location: Right arm, Patient Position: Sitting, Cuff Size: Standard)   Pulse 56   Wt 58.1 kg (128 lb)   LMP 2024 (Approximate)   Breastfeeding No   BMI 23.41 kg/m²   Physical Exam  Constitutional:       General: She is not in acute distress.     Appearance: She is  well-developed.   Genitourinary:      Vulva normal.      No lesions in the vagina.        Right Adnexa: not tender and no mass present.     Left Adnexa: not tender and no mass present.     No cervical motion tenderness or lesion.      Uterus is not tender.   Breasts:     Right: No mass, nipple discharge, skin change or tenderness.      Left: No mass, nipple discharge, skin change or tenderness.   Neck:      Thyroid: No thyromegaly.   Cardiovascular:      Rate and Rhythm: Normal rate and regular rhythm.   Pulmonary:      Effort: Pulmonary effort is normal.   Abdominal:      Palpations: Abdomen is soft.      Tenderness: There is no abdominal tenderness.   Musculoskeletal:      Cervical back: Neck supple.   Neurological:      Mental Status: She is alert and oriented to person, place, and time.   Skin:     General: Skin is warm and dry.   Vitals reviewed.       Assessment/Plan:   1. Normal well-woman GYN exam.  2. Cervical cancer screening:  Normal cervical exam.  Pap smear done with HPV reflex.  Has received HPV vaccine x2 doses last year, and desires to complete the series with a 3rd vaccine dose.  Will return for vaccine in 1-2 weeks.   3. STD screening:  Patient declines due to no health insurance.   4. Breast cancer screening:  Normal breast exam.  Reviewed breast self-awareness.   5. Depression Screening: Patient's depression screening was assessed with a PHQ-2 score of 0. Clinically patient does not have depression. No treatment is required.   6. BMI Counseling: Body mass index is 23.41 kg/m².  No intervention indicated.   7. Contraception:  OCP's.  Given Rx refills for another year.   8. Return to office in 1 year for annual GYN exam.    Reviewed with patient that test results are available in [employer] immediately, but that they will not necessarily be reviewed by me immediately.  Explained that I will review results at my earliest opportunity and contact patient appropriately.

## 2024-11-07 NOTE — PATIENT INSTRUCTIONS
Ashley por rios confianza en nuestro equipo.   Le agradecemos y agradecemos amanda comentarios.   Si recibe kolton encuesta nuestra, tómese unos momentos para informarnos cómo estamos.   Sinceramente,  ANDREAS Torres

## 2024-11-12 LAB
LAB AP GYN PRIMARY INTERPRETATION: NORMAL
Lab: NORMAL

## 2024-11-19 ENCOUNTER — CLINICAL SUPPORT (OUTPATIENT)
Dept: OBGYN CLINIC | Facility: CLINIC | Age: 29
End: 2024-11-19

## 2024-11-19 VITALS
BODY MASS INDEX: 23.59 KG/M2 | SYSTOLIC BLOOD PRESSURE: 116 MMHG | WEIGHT: 129 LBS | HEART RATE: 72 BPM | DIASTOLIC BLOOD PRESSURE: 72 MMHG

## 2024-11-19 DIAGNOSIS — Z23 NEED FOR HPV VACCINE: Primary | ICD-10-CM

## 2024-11-19 PROCEDURE — 90651 9VHPV VACCINE 2/3 DOSE IM: CPT

## 2024-11-19 PROCEDURE — 90471 IMMUNIZATION ADMIN: CPT
